# Patient Record
Sex: FEMALE | Race: BLACK OR AFRICAN AMERICAN | Employment: UNEMPLOYED | URBAN - METROPOLITAN AREA
[De-identification: names, ages, dates, MRNs, and addresses within clinical notes are randomized per-mention and may not be internally consistent; named-entity substitution may affect disease eponyms.]

---

## 2018-04-30 ENCOUNTER — HOSPITAL ENCOUNTER (OUTPATIENT)
Dept: NON INVASIVE DIAGNOSTICS | Age: 43
Discharge: HOME | End: 2018-04-30
Attending: INTERNAL MEDICINE
Payer: MEDICAID

## 2018-04-30 VITALS — WEIGHT: 293 LBS | BODY MASS INDEX: 47.09 KG/M2 | HEIGHT: 66 IN

## 2018-04-30 DIAGNOSIS — R94.31 ABNORMAL ELECTROCARDIOGRAM: ICD-10-CM

## 2018-04-30 DIAGNOSIS — R06.02 SHORTNESS OF BREATH: ICD-10-CM

## 2018-04-30 LAB
ATTENDING PHYSICIAN, CST07: NORMAL
DIAGNOSIS, 93000: NORMAL
DUKE TM SCORE RESULT, CST14: NORMAL
DUKE TREADMILL SCORE, CST13: NORMAL
ECG INTERP BEFORE EX, CST11: NORMAL
ECG INTERP DURING EX, CST12: NORMAL
FUNCTIONAL CAPACITY, CST17: NORMAL
KNOWN CARDIAC CONDITION, CST08: NORMAL
MAX. DIASTOLIC BP, CST04: 90 MMHG
MAX. HEART RATE, CST05: 160 BPM
MAX. SYSTOLIC BP, CST03: 185 MMHG
OVERALL BP RESPONSE TO EXERCISE, CST16: NORMAL
OVERALL HR RESPONSE TO EXERCISE, CST15: NORMAL
PEAK EX METS, CST10: 4.6 METS
PROTOCOL NAME, CST01: NORMAL
TEST INDICATION, CST09: NORMAL

## 2018-04-30 PROCEDURE — 93351 STRESS TTE COMPLETE: CPT

## 2018-04-30 RX ORDER — ATROPINE SULFATE 0.1 MG/ML
1 INJECTION INTRAVENOUS
Status: ACTIVE | OUTPATIENT
Start: 2018-04-30 | End: 2018-04-30

## 2018-04-30 RX ORDER — DOBUTAMINE HYDROCHLORIDE 200 MG/100ML
10-40 INJECTION INTRAVENOUS
Status: ACTIVE | OUTPATIENT
Start: 2018-04-30 | End: 2018-04-30

## 2019-05-13 ENCOUNTER — HOSPITAL ENCOUNTER (OUTPATIENT)
Dept: MRI IMAGING | Age: 44
Discharge: HOME | End: 2019-05-13
Attending: ORTHOPAEDIC SURGERY
Payer: MEDICAID

## 2019-05-13 DIAGNOSIS — M25.511 PAIN IN SHOULDER REGION, RIGHT: ICD-10-CM

## 2019-05-13 PROCEDURE — 73221 MRI JOINT UPR EXTREM W/O DYE: CPT

## 2019-06-21 ENCOUNTER — HOSPITAL ENCOUNTER (OUTPATIENT)
Dept: PREADMISSION TESTING | Age: 44
Discharge: HOME | End: 2019-06-21
Payer: MEDICAID

## 2019-06-21 VITALS
TEMPERATURE: 97.9 F | SYSTOLIC BLOOD PRESSURE: 105 MMHG | HEIGHT: 66 IN | BODY MASS INDEX: 47.09 KG/M2 | HEART RATE: 82 BPM | DIASTOLIC BLOOD PRESSURE: 61 MMHG | WEIGHT: 293 LBS | RESPIRATION RATE: 18 BRPM

## 2019-06-21 LAB
ATRIAL RATE: 61 BPM
CALCULATED P AXIS, ECG09: 3 DEGREES
CALCULATED R AXIS, ECG10: 31 DEGREES
CALCULATED T AXIS, ECG11: -27 DEGREES
CREAT SERPL-MCNC: 1.06 MG/DL (ref 0.55–1.02)
DIAGNOSIS, 93000: NORMAL
HCG SERPL QL: NEGATIVE
HGB BLD-MCNC: 12.8 G/DL (ref 11.5–16)
P-R INTERVAL, ECG05: 186 MS
POTASSIUM SERPL-SCNC: 3.8 MMOL/L (ref 3.5–5.1)
Q-T INTERVAL, ECG07: 402 MS
QRS DURATION, ECG06: 78 MS
QTC CALCULATION (BEZET), ECG08: 404 MS
VENTRICULAR RATE, ECG03: 61 BPM

## 2019-06-21 PROCEDURE — 85018 HEMOGLOBIN: CPT

## 2019-06-21 PROCEDURE — 36415 COLL VENOUS BLD VENIPUNCTURE: CPT

## 2019-06-21 PROCEDURE — 82565 ASSAY OF CREATININE: CPT

## 2019-06-21 PROCEDURE — 84132 ASSAY OF SERUM POTASSIUM: CPT

## 2019-06-21 PROCEDURE — 84703 CHORIONIC GONADOTROPIN ASSAY: CPT

## 2019-06-21 PROCEDURE — 93005 ELECTROCARDIOGRAM TRACING: CPT

## 2019-06-21 RX ORDER — IPRATROPIUM BROMIDE AND ALBUTEROL SULFATE 2.5; .5 MG/3ML; MG/3ML
3 SOLUTION RESPIRATORY (INHALATION)
COMMUNITY
End: 2020-07-30

## 2019-06-21 RX ORDER — PRAZOSIN HYDROCHLORIDE 5 MG/1
5 CAPSULE ORAL
COMMUNITY
End: 2020-09-27

## 2019-06-21 RX ORDER — TOPIRAMATE 100 MG/1
100 TABLET, FILM COATED ORAL 2 TIMES DAILY
COMMUNITY

## 2019-06-21 RX ORDER — METHOCARBAMOL 750 MG/1
750 TABLET, FILM COATED ORAL
COMMUNITY
End: 2020-11-23

## 2019-06-21 RX ORDER — SPIRONOLACTONE 25 MG/1
25 TABLET ORAL DAILY
COMMUNITY
End: 2020-10-21

## 2019-06-21 RX ORDER — RANITIDINE 150 MG/1
150 TABLET, FILM COATED ORAL 2 TIMES DAILY
COMMUNITY
End: 2020-10-21

## 2019-06-21 RX ORDER — PRAZOSIN HYDROCHLORIDE 2 MG/1
2 CAPSULE ORAL 2 TIMES DAILY WITH MEALS
COMMUNITY
End: 2020-08-17

## 2019-06-21 RX ORDER — MELOXICAM 15 MG/1
15 TABLET ORAL DAILY
COMMUNITY
End: 2020-11-23

## 2019-06-21 RX ORDER — CLONIDINE HYDROCHLORIDE 0.1 MG/1
0.1 TABLET ORAL 2 TIMES DAILY
COMMUNITY
End: 2020-08-17

## 2019-06-21 RX ORDER — MONTELUKAST SODIUM 10 MG/1
10 TABLET ORAL DAILY
COMMUNITY
End: 2021-01-20

## 2019-06-21 NOTE — PERIOP NOTES
DO NOT EAT OR DRINK ANYTHING AFTER MIDNIGHT, except as instructed THE NIGHT BEFORE SURGERY. PT GIVEN OPPORTUNITY TO ASK ADDITIONAL QUESTIONS. Patient given two bottles CHG soap and instruction sheet, instructions for use reviewed with patient. Patient given surgical site infection information FAQs handout and hand hygiene tips sheet. Pre-operative instructions reviewed and patient verbalizes understanding of instructions. Patient has been given the opportunity to ask additional questions. NO ORDERS SENT TO PAT OR PLACED IN CC. OFFICE CALLED TO SPEAK WITH DR RIA JIANG'S MA. APOLINAR HAD VM STATING SHE WAS OUT OF OFFICE UNTIL Monday, 6/24/19. MESSAGE LEFT FOR NURSE CHECKING APOLINAR'S MESSAGES. SHOULDER PROTOCOL LABS ORDERED AND DONE. SPOKE WITH BECKY AT #[phone]. SHE STATES SHOULDER PROTOCOL WAS OKAY TO DO ALONG WITH AN EKG BASED ON PT'S WEIGHT. SHE WILL PUT A NOTE IN PT'S OFFICE CHART.

## 2019-06-24 ENCOUNTER — ANESTHESIA EVENT (OUTPATIENT)
Dept: SURGERY | Age: 44
End: 2019-06-24
Payer: MEDICAID

## 2019-06-25 ENCOUNTER — ANESTHESIA (OUTPATIENT)
Dept: SURGERY | Age: 44
End: 2019-06-25
Payer: MEDICAID

## 2019-06-25 ENCOUNTER — HOSPITAL ENCOUNTER (OUTPATIENT)
Age: 44
Discharge: HOME | End: 2019-06-25
Attending: ORTHOPAEDIC SURGERY | Admitting: ORTHOPAEDIC SURGERY
Payer: MEDICAID

## 2019-06-25 VITALS
DIASTOLIC BLOOD PRESSURE: 72 MMHG | SYSTOLIC BLOOD PRESSURE: 139 MMHG | BODY MASS INDEX: 47.09 KG/M2 | HEART RATE: 75 BPM | OXYGEN SATURATION: 94 % | WEIGHT: 293 LBS | TEMPERATURE: 97.7 F | HEIGHT: 66 IN | RESPIRATION RATE: 20 BRPM

## 2019-06-25 PROBLEM — M75.41 IMPINGEMENT SYNDROME OF RIGHT SHOULDER: Status: ACTIVE | Noted: 2019-06-25

## 2019-06-25 LAB — HCG UR QL: NEGATIVE

## 2019-06-25 PROCEDURE — 76060000036 HC ANESTHESIA 2.5 TO 3 HR: Performed by: ORTHOPAEDIC SURGERY

## 2019-06-25 PROCEDURE — 74011250637 HC RX REV CODE- 250/637: Performed by: ANESTHESIOLOGY

## 2019-06-25 PROCEDURE — 77030018835 HC SOL IRR LR ICUM -A: Performed by: ORTHOPAEDIC SURGERY

## 2019-06-25 PROCEDURE — 74011000250 HC RX REV CODE- 250

## 2019-06-25 PROCEDURE — 74011250636 HC RX REV CODE- 250/636

## 2019-06-25 PROCEDURE — 77030006988: Performed by: ORTHOPAEDIC SURGERY

## 2019-06-25 PROCEDURE — 77030026438 HC STYL ET INTUB CARD -A: Performed by: ANESTHESIOLOGY

## 2019-06-25 PROCEDURE — 77030002916 HC SUT ETHLN J&J -A: Performed by: ORTHOPAEDIC SURGERY

## 2019-06-25 PROCEDURE — 77030008684 HC TU ET CUF COVD -B: Performed by: ANESTHESIOLOGY

## 2019-06-25 PROCEDURE — 77030003601 HC NDL NRV BLK BBMI -A

## 2019-06-25 PROCEDURE — 77030020782 HC GWN BAIR PAWS FLX 3M -B

## 2019-06-25 PROCEDURE — A4565 SLINGS: HCPCS | Performed by: ORTHOPAEDIC SURGERY

## 2019-06-25 PROCEDURE — 76010000131 HC OR TIME 2 TO 2.5 HR: Performed by: ORTHOPAEDIC SURGERY

## 2019-06-25 PROCEDURE — 74011250636 HC RX REV CODE- 250/636: Performed by: ORTHOPAEDIC SURGERY

## 2019-06-25 PROCEDURE — 74011250637 HC RX REV CODE- 250/637: Performed by: ORTHOPAEDIC SURGERY

## 2019-06-25 PROCEDURE — 76210000001 HC OR PH I REC 2.5 TO 3 HR: Performed by: ORTHOPAEDIC SURGERY

## 2019-06-25 PROCEDURE — 76210000020 HC REC RM PH II FIRST 0.5 HR: Performed by: ORTHOPAEDIC SURGERY

## 2019-06-25 PROCEDURE — 74011250636 HC RX REV CODE- 250/636: Performed by: ANESTHESIOLOGY

## 2019-06-25 PROCEDURE — 81025 URINE PREGNANCY TEST: CPT

## 2019-06-25 RX ORDER — SODIUM CHLORIDE 0.9 % (FLUSH) 0.9 %
5-40 SYRINGE (ML) INJECTION AS NEEDED
Status: DISCONTINUED | OUTPATIENT
Start: 2019-06-25 | End: 2019-06-25 | Stop reason: HOSPADM

## 2019-06-25 RX ORDER — ROCURONIUM BROMIDE 10 MG/ML
INJECTION, SOLUTION INTRAVENOUS AS NEEDED
Status: DISCONTINUED | OUTPATIENT
Start: 2019-06-25 | End: 2019-06-25 | Stop reason: HOSPADM

## 2019-06-25 RX ORDER — FENTANYL CITRATE 50 UG/ML
50 INJECTION, SOLUTION INTRAMUSCULAR; INTRAVENOUS AS NEEDED
Status: DISCONTINUED | OUTPATIENT
Start: 2019-06-25 | End: 2019-06-25 | Stop reason: HOSPADM

## 2019-06-25 RX ORDER — LIDOCAINE HYDROCHLORIDE 10 MG/ML
0.1 INJECTION, SOLUTION EPIDURAL; INFILTRATION; INTRACAUDAL; PERINEURAL AS NEEDED
Status: DISCONTINUED | OUTPATIENT
Start: 2019-06-25 | End: 2019-06-25 | Stop reason: HOSPADM

## 2019-06-25 RX ORDER — FENTANYL CITRATE 50 UG/ML
INJECTION, SOLUTION INTRAMUSCULAR; INTRAVENOUS AS NEEDED
Status: DISCONTINUED | OUTPATIENT
Start: 2019-06-25 | End: 2019-06-25 | Stop reason: HOSPADM

## 2019-06-25 RX ORDER — SODIUM CHLORIDE 9 MG/ML
25 INJECTION, SOLUTION INTRAVENOUS CONTINUOUS
Status: DISCONTINUED | OUTPATIENT
Start: 2019-06-25 | End: 2019-06-25 | Stop reason: HOSPADM

## 2019-06-25 RX ORDER — ACETAMINOPHEN 325 MG/1
650 TABLET ORAL ONCE
Status: COMPLETED | OUTPATIENT
Start: 2019-06-25 | End: 2019-06-25

## 2019-06-25 RX ORDER — VANCOMYCIN/0.9 % SOD CHLORIDE 1.5G/250ML
1.5 PLASTIC BAG, INJECTION (ML) INTRAVENOUS ONCE
Status: COMPLETED | OUTPATIENT
Start: 2019-06-25 | End: 2019-06-25

## 2019-06-25 RX ORDER — MIDAZOLAM HYDROCHLORIDE 1 MG/ML
1 INJECTION, SOLUTION INTRAMUSCULAR; INTRAVENOUS AS NEEDED
Status: DISCONTINUED | OUTPATIENT
Start: 2019-06-25 | End: 2019-06-25 | Stop reason: HOSPADM

## 2019-06-25 RX ORDER — SODIUM CHLORIDE 9 MG/ML
100 INJECTION, SOLUTION INTRAVENOUS CONTINUOUS
Status: DISCONTINUED | OUTPATIENT
Start: 2019-06-25 | End: 2019-06-25 | Stop reason: HOSPADM

## 2019-06-25 RX ORDER — SUCCINYLCHOLINE CHLORIDE 20 MG/ML
INJECTION INTRAMUSCULAR; INTRAVENOUS AS NEEDED
Status: DISCONTINUED | OUTPATIENT
Start: 2019-06-25 | End: 2019-06-25 | Stop reason: HOSPADM

## 2019-06-25 RX ORDER — LIDOCAINE HYDROCHLORIDE 20 MG/ML
INJECTION, SOLUTION EPIDURAL; INFILTRATION; INTRACAUDAL; PERINEURAL AS NEEDED
Status: DISCONTINUED | OUTPATIENT
Start: 2019-06-25 | End: 2019-06-25 | Stop reason: HOSPADM

## 2019-06-25 RX ORDER — NEOSTIGMINE METHYLSULFATE 1 MG/ML
INJECTION INTRAVENOUS AS NEEDED
Status: DISCONTINUED | OUTPATIENT
Start: 2019-06-25 | End: 2019-06-25 | Stop reason: HOSPADM

## 2019-06-25 RX ORDER — SODIUM CHLORIDE 0.9 % (FLUSH) 0.9 %
5-40 SYRINGE (ML) INJECTION EVERY 8 HOURS
Status: DISCONTINUED | OUTPATIENT
Start: 2019-06-25 | End: 2019-06-25 | Stop reason: HOSPADM

## 2019-06-25 RX ORDER — ONDANSETRON 2 MG/ML
4 INJECTION INTRAMUSCULAR; INTRAVENOUS AS NEEDED
Status: DISCONTINUED | OUTPATIENT
Start: 2019-06-25 | End: 2019-06-25 | Stop reason: HOSPADM

## 2019-06-25 RX ORDER — ALBUTEROL SULFATE 90 UG/1
90 AEROSOL, METERED RESPIRATORY (INHALATION)
COMMUNITY
Start: 2019-06-19 | End: 2020-11-23

## 2019-06-25 RX ORDER — SODIUM CHLORIDE 0.9 % (FLUSH) 0.9 %
5-40 SYRINGE (ML) INJECTION EVERY 8 HOURS
Status: CANCELLED | OUTPATIENT
Start: 2019-06-25

## 2019-06-25 RX ORDER — DEXAMETHASONE SODIUM PHOSPHATE 4 MG/ML
INJECTION, SOLUTION INTRA-ARTICULAR; INTRALESIONAL; INTRAMUSCULAR; INTRAVENOUS; SOFT TISSUE AS NEEDED
Status: DISCONTINUED | OUTPATIENT
Start: 2019-06-25 | End: 2019-06-25 | Stop reason: HOSPADM

## 2019-06-25 RX ORDER — SODIUM CHLORIDE 0.9 % (FLUSH) 0.9 %
5-40 SYRINGE (ML) INJECTION AS NEEDED
Status: CANCELLED | OUTPATIENT
Start: 2019-06-25

## 2019-06-25 RX ORDER — MIDAZOLAM HYDROCHLORIDE 1 MG/ML
0.5 INJECTION, SOLUTION INTRAMUSCULAR; INTRAVENOUS
Status: DISCONTINUED | OUTPATIENT
Start: 2019-06-25 | End: 2019-06-25 | Stop reason: HOSPADM

## 2019-06-25 RX ORDER — FENTANYL CITRATE 50 UG/ML
25 INJECTION, SOLUTION INTRAMUSCULAR; INTRAVENOUS
Status: DISCONTINUED | OUTPATIENT
Start: 2019-06-25 | End: 2019-06-25 | Stop reason: HOSPADM

## 2019-06-25 RX ORDER — PROPOFOL 10 MG/ML
INJECTION, EMULSION INTRAVENOUS AS NEEDED
Status: DISCONTINUED | OUTPATIENT
Start: 2019-06-25 | End: 2019-06-25 | Stop reason: HOSPADM

## 2019-06-25 RX ORDER — GLYCOPYRROLATE 0.2 MG/ML
INJECTION INTRAMUSCULAR; INTRAVENOUS AS NEEDED
Status: DISCONTINUED | OUTPATIENT
Start: 2019-06-25 | End: 2019-06-25 | Stop reason: HOSPADM

## 2019-06-25 RX ORDER — ONDANSETRON 2 MG/ML
INJECTION INTRAMUSCULAR; INTRAVENOUS AS NEEDED
Status: DISCONTINUED | OUTPATIENT
Start: 2019-06-25 | End: 2019-06-25 | Stop reason: HOSPADM

## 2019-06-25 RX ORDER — DIPHENHYDRAMINE HYDROCHLORIDE 50 MG/ML
12.5 INJECTION, SOLUTION INTRAMUSCULAR; INTRAVENOUS AS NEEDED
Status: DISCONTINUED | OUTPATIENT
Start: 2019-06-25 | End: 2019-06-25 | Stop reason: HOSPADM

## 2019-06-25 RX ORDER — HYDROMORPHONE HYDROCHLORIDE 1 MG/ML
0.2 INJECTION, SOLUTION INTRAMUSCULAR; INTRAVENOUS; SUBCUTANEOUS
Status: DISCONTINUED | OUTPATIENT
Start: 2019-06-25 | End: 2019-06-25 | Stop reason: HOSPADM

## 2019-06-25 RX ORDER — SODIUM CHLORIDE, SODIUM LACTATE, POTASSIUM CHLORIDE, CALCIUM CHLORIDE 600; 310; 30; 20 MG/100ML; MG/100ML; MG/100ML; MG/100ML
75 INJECTION, SOLUTION INTRAVENOUS CONTINUOUS
Status: DISCONTINUED | OUTPATIENT
Start: 2019-06-25 | End: 2019-06-25 | Stop reason: HOSPADM

## 2019-06-25 RX ORDER — HYDROMORPHONE HYDROCHLORIDE 2 MG/1
2 TABLET ORAL ONCE
Status: COMPLETED | OUTPATIENT
Start: 2019-06-25 | End: 2019-06-25

## 2019-06-25 RX ORDER — SODIUM CHLORIDE, SODIUM LACTATE, POTASSIUM CHLORIDE, CALCIUM CHLORIDE 600; 310; 30; 20 MG/100ML; MG/100ML; MG/100ML; MG/100ML
INJECTION, SOLUTION INTRAVENOUS
Status: DISCONTINUED | OUTPATIENT
Start: 2019-06-25 | End: 2019-06-25 | Stop reason: HOSPADM

## 2019-06-25 RX ORDER — ROPIVACAINE HYDROCHLORIDE 5 MG/ML
30 INJECTION, SOLUTION EPIDURAL; INFILTRATION; PERINEURAL ONCE
Status: DISCONTINUED | OUTPATIENT
Start: 2019-06-25 | End: 2019-06-25 | Stop reason: HOSPADM

## 2019-06-25 RX ORDER — MORPHINE SULFATE 10 MG/ML
2 INJECTION, SOLUTION INTRAMUSCULAR; INTRAVENOUS
Status: DISCONTINUED | OUTPATIENT
Start: 2019-06-25 | End: 2019-06-25 | Stop reason: HOSPADM

## 2019-06-25 RX ORDER — SODIUM CHLORIDE, SODIUM LACTATE, POTASSIUM CHLORIDE, CALCIUM CHLORIDE 600; 310; 30; 20 MG/100ML; MG/100ML; MG/100ML; MG/100ML
125 INJECTION, SOLUTION INTRAVENOUS CONTINUOUS
Status: DISCONTINUED | OUTPATIENT
Start: 2019-06-25 | End: 2019-06-25 | Stop reason: HOSPADM

## 2019-06-25 RX ADMIN — HYDROMORPHONE HYDROCHLORIDE 2 MG: 2 TABLET ORAL at 17:13

## 2019-06-25 RX ADMIN — FENTANYL CITRATE 50 MCG: 50 INJECTION, SOLUTION INTRAMUSCULAR; INTRAVENOUS at 14:21

## 2019-06-25 RX ADMIN — DEXAMETHASONE SODIUM PHOSPHATE 8 MG: 4 INJECTION, SOLUTION INTRA-ARTICULAR; INTRALESIONAL; INTRAMUSCULAR; INTRAVENOUS; SOFT TISSUE at 13:39

## 2019-06-25 RX ADMIN — PROPOFOL 200 MG: 10 INJECTION, EMULSION INTRAVENOUS at 13:02

## 2019-06-25 RX ADMIN — LIDOCAINE HYDROCHLORIDE 100 MG: 20 INJECTION, SOLUTION EPIDURAL; INFILTRATION; INTRACAUDAL; PERINEURAL at 13:02

## 2019-06-25 RX ADMIN — ROCURONIUM BROMIDE 40 MG: 10 INJECTION, SOLUTION INTRAVENOUS at 13:14

## 2019-06-25 RX ADMIN — NEOSTIGMINE METHYLSULFATE 2 MG: 1 INJECTION INTRAVENOUS at 14:40

## 2019-06-25 RX ADMIN — MIDAZOLAM HYDROCHLORIDE 3 MG: 1 INJECTION, SOLUTION INTRAMUSCULAR; INTRAVENOUS at 12:39

## 2019-06-25 RX ADMIN — FENTANYL CITRATE 25 MCG: 50 INJECTION INTRAMUSCULAR; INTRAVENOUS at 15:47

## 2019-06-25 RX ADMIN — ACETAMINOPHEN 650 MG: 325 TABLET ORAL at 12:28

## 2019-06-25 RX ADMIN — FENTANYL CITRATE 25 MCG: 50 INJECTION INTRAMUSCULAR; INTRAVENOUS at 16:15

## 2019-06-25 RX ADMIN — SODIUM CHLORIDE, SODIUM LACTATE, POTASSIUM CHLORIDE, CALCIUM CHLORIDE: 600; 310; 30; 20 INJECTION, SOLUTION INTRAVENOUS at 12:30

## 2019-06-25 RX ADMIN — FENTANYL CITRATE 50 MCG: 50 INJECTION, SOLUTION INTRAMUSCULAR; INTRAVENOUS at 13:02

## 2019-06-25 RX ADMIN — PROPOFOL 100 MG: 10 INJECTION, EMULSION INTRAVENOUS at 14:21

## 2019-06-25 RX ADMIN — ONDANSETRON 4 MG: 2 INJECTION INTRAMUSCULAR; INTRAVENOUS at 14:38

## 2019-06-25 RX ADMIN — VANCOMYCIN HYDROCHLORIDE 1500 MG: 10 INJECTION, POWDER, LYOPHILIZED, FOR SOLUTION INTRAVENOUS at 12:34

## 2019-06-25 RX ADMIN — FENTANYL CITRATE 100 MCG: 50 INJECTION, SOLUTION INTRAMUSCULAR; INTRAVENOUS at 12:39

## 2019-06-25 RX ADMIN — FENTANYL CITRATE 50 MCG: 50 INJECTION, SOLUTION INTRAMUSCULAR; INTRAVENOUS at 14:35

## 2019-06-25 RX ADMIN — SUCCINYLCHOLINE CHLORIDE 200 MG: 20 INJECTION INTRAMUSCULAR; INTRAVENOUS at 13:02

## 2019-06-25 RX ADMIN — FENTANYL CITRATE 25 MCG: 50 INJECTION INTRAMUSCULAR; INTRAVENOUS at 15:30

## 2019-06-25 RX ADMIN — SODIUM CHLORIDE, SODIUM LACTATE, POTASSIUM CHLORIDE, AND CALCIUM CHLORIDE 125 ML/HR: 600; 310; 30; 20 INJECTION, SOLUTION INTRAVENOUS at 12:34

## 2019-06-25 RX ADMIN — GLYCOPYRROLATE 0.4 MG: 0.2 INJECTION INTRAMUSCULAR; INTRAVENOUS at 14:40

## 2019-06-25 RX ADMIN — ONDANSETRON 4 MG: 2 INJECTION INTRAMUSCULAR; INTRAVENOUS at 17:13

## 2019-06-25 NOTE — ANESTHESIA PREPROCEDURE EVALUATION
Relevant Problems   No relevant active problems       Anesthetic History   No history of anesthetic complications            Review of Systems / Medical History  Patient summary reviewed, nursing notes reviewed and pertinent labs reviewed    Pulmonary        Sleep apnea: CPAP    Asthma : well controlled       Neuro/Psych   Within defined limits           Cardiovascular    Hypertension: well controlled                   GI/Hepatic/Renal     GERD           Endo/Other        Morbid obesity     Other Findings              Physical Exam    Airway  Mallampati: II  TM Distance: > 6 cm  Neck ROM: normal range of motion   Mouth opening: Normal     Cardiovascular  Regular rate and rhythm,  S1 and S2 normal,  no murmur, click, rub, or gallop             Dental  No notable dental hx       Pulmonary  Breath sounds clear to auscultation               Abdominal  GI exam deferred       Other Findings            Anesthetic Plan    ASA: 3  Anesthesia type: general      Post-op pain plan if not by surgeon: peripheral nerve block single    Induction: Intravenous  Anesthetic plan and risks discussed with: Patient

## 2019-06-25 NOTE — ANESTHESIA POSTPROCEDURE EVALUATION
Post-Anesthesia Evaluation and Assessment    Patient: Luciano Salgado MRN: [id number]  SSN: [id number]    YOB: 1975  Age: 37 y.o. Sex: female      I have evaluated the patient and they are stable and ready for discharge from the PACU. Cardiovascular Function/Vital Signs  Visit Vitals  /82   Pulse 77   Temp 36.5 °C (97.7 °F)   Resp 22   Ht 5' 6\" (1.676 m)   Wt (!) 170 kg (374 lb 12.8 oz)   SpO2 95%   BMI 60.49 kg/m²       Patient is status post General anesthesia for Procedure(s):  RIGHT SHOULDER ARTHROSCOPY/ ACROMIOPLASTY/DISTAL CLAVICLE EXCISION. Nausea/Vomiting: None    Postoperative hydration reviewed and adequate. Pain:  Pain Scale 1: Visual (06/25/19 1525)  Pain Intensity 1: 0 (06/25/19 1525)   Managed    Neurological Status:   Neuro (WDL): Exceptions to WDL (06/25/19 1525)  Neuro  Neurologic State: Drowsy (06/25/19 1525)  LUE Motor Response: Purposeful (06/25/19 1525)  LLE Motor Response: Purposeful (06/25/19 1525)  RUE Motor Response: Numbness (06/25/19 1525)  RLE Motor Response: Purposeful (06/25/19 1525)   At baseline    Mental Status, Level of Consciousness: Alert and  oriented to person, place, and time    Pulmonary Status:   O2 Device: Nasal cannula (06/25/19 1525)   Adequate oxygenation and airway patent    Complications related to anesthesia: None    Post-anesthesia assessment completed. No concerns    Signed By: Diego Ruelas MD     June 25, 2019              Procedure(s):  RIGHT SHOULDER ARTHROSCOPY/ ACROMIOPLASTY/DISTAL CLAVICLE EXCISION. general    <BSHSIANPOST>    Vitals Value Taken Time   /90 6/25/2019  4:30 PM   Temp 36.5 °C (97.7 °F) 6/25/2019  3:25 PM   Pulse 73 6/25/2019  4:38 PM   Resp 18 6/25/2019  4:38 PM   SpO2 93 % 6/25/2019  4:38 PM   Vitals shown include unvalidated device data.

## 2019-06-25 NOTE — ANESTHESIA PROCEDURE NOTES
Peripheral Block    Start time: 6/25/2019 12:36 PM  End time: 6/25/2019 12:42 PM  Performed by: Aníbal Patterson MD  Authorized by: Aníbal Patterson MD       Pre-procedure: Indications: at surgeon's request and post-op pain management    Preanesthetic Checklist: patient identified, risks and benefits discussed, site marked, timeout performed, anesthesia consent given and patient being monitored    Timeout Time: 12:36          Block Type:   Block Type:   Interscalene and supraclavicular  Laterality:  Right  Monitoring:  Standard ASA monitoring, continuous pulse ox, frequent vital sign checks, heart rate, responsive to questions and oxygen  Injection Technique:  Single shot  Procedures: ultrasound guided and nerve stimulator    Patient Position: supine  Prep: betadine and povidone-iodine 7.5% surgical scrub    Location:  Interscalene  Needle Type:  Stimuplex  Needle Gauge:  22 G  Needle Localization:  Nerve stimulator and ultrasound guidance  Motor Response: minimal motor response >0.4 mA      Assessment:  Number of attempts:  1  Injection Assessment:  Incremental injection every 5 mL, local visualized surrounding nerve on ultrasound, negative aspiration for blood, no paresthesia, negative aspiration for CSF and ultrasound image on chart  Patient tolerance:  Patient tolerated the procedure well with no immediate complications

## 2019-06-26 NOTE — OP NOTES
[facility]  OPERATIVE REPORT    Name:  Rhea Hudson  MR#:  [id number]  :  1975  ACCOUNT #:  [de-identified]  DATE OF SERVICE:  2019    PREOPERATIVE DIAGNOSES:  Right shoulder partial rotator cuff tear, impingement, acromioclavicular joint arthrosis, bursitis. POSTOPERATIVE DIAGNOSIS:  same    PROCEDURE PERFORMED:  Right shoulder arthroscopy with partial rotator cuff debridement, bursectomy, acromioplasty, distal clavicle excision. SURGEON:  Kendra Lares MD    FIRST ASSISTANT:  Lashell Davies MD    ANESTHESIA:  General with a block. COMPLICATIONS:  None. SPECIMENS REMOVED:  None. ESTIMATED BLOOD LOSS:  Approximately 5 mL. DRAINS:  None. INDICATION FOR PROCEDURE:  The patient is a 13-year-old female with a long history of  bilateral shoulder pain. She initially had her left side operated on and has done well and now presents for the right side with MRI-documented pathology after extensive preoperative workup. PROCEDURE IN DETAIL:   After being explained the risks and benefits, undergoing extensive preoperative workup, the patient underwent informed consent for the above procedure. She has been kept n.p.o. overnight and received preoperative antibiotics. The patient was taken from the holding area to the operating room. After general anesthesia had been established with a block, the patient was placed in lateral decubitus position in a well-padded beanbag and axillary roll with the right side up. Right upper extremity was examined under anesthesia, prepped and draped in standard surgical fashion, placed in 10 pounds of general suspension. Time-out was called. Correct patient and site were identified with the operating room staff and Anesthesia staff. We then proceeded to establish posterior portal.  Diagnostic arthroscopy of the glenohumeral joint was undertaken. There were no loose bodies within the glenohumeral joint.   There were no significant chondral lesions. There was some fraying to the superior labrum, but no yefri tears. There was some undersurface fraying of the supraspinatus. An anterior portal was established and this was debrided to a stable edge. No evidence of high-grade partial tear. Subscapularis was pristine as well as the infraspinatus. Biceps was intact. Biceps pulley was intact. No evidence of capsular or labral tearing. We then proceeded with subacromial space. We established an anterior and lateral portal.  A thorough bursectomy was undertaken. There was no evidence of a bursal-sided rotator cuff tear. We proceeded to perform a standard acromioplasty removing approximately 7 mm of bone from the anterior acromion. We proceeded to perform a standard distal clavicle excision removing approximately 10 mm of bone from the distal clavicle. We took final arthroscopic pictures. We irrigated copiously. We closed the portals with interrupted nylon suture. Dry sterile dressing was applied. At the end of the case, the counts were correct. The patient was placed in a sling and taken to recovery room in stable condition.         Rajesh Alcala MD      [id number]  D:  06/25/2019 14:43  T:  06/25/2019 20:55  JOB #:  [id number]  CC:  Kishore Phililps MD

## 2019-11-13 ENCOUNTER — HOSPITAL ENCOUNTER (OUTPATIENT)
Dept: GENERAL RADIOLOGY | Age: 44
Discharge: HOME | End: 2019-11-13
Payer: COMMERCIAL

## 2019-11-13 DIAGNOSIS — M17.0 PRIMARY OSTEOARTHRITIS OF BOTH KNEES: ICD-10-CM

## 2019-11-13 PROCEDURE — 72114 X-RAY EXAM L-S SPINE BENDING: CPT

## 2019-11-13 PROCEDURE — 73565 X-RAY EXAM OF KNEES: CPT

## 2020-06-18 ENCOUNTER — HOSPITAL ENCOUNTER (OUTPATIENT)
Dept: MRI IMAGING | Age: 45
Discharge: HOME | End: 2020-06-18
Attending: PAIN MEDICINE
Payer: COMMERCIAL

## 2020-06-18 ENCOUNTER — HOSPITAL ENCOUNTER (OUTPATIENT)
Dept: GENERAL RADIOLOGY | Age: 45
Discharge: HOME | End: 2020-06-18
Attending: INTERNAL MEDICINE
Payer: COMMERCIAL

## 2020-06-18 DIAGNOSIS — J44.9 COPD WITH ASTHMA (HCC): ICD-10-CM

## 2020-06-18 DIAGNOSIS — M54.16 RADICULOPATHY, LUMBAR REGION: ICD-10-CM

## 2020-06-18 PROCEDURE — 71046 X-RAY EXAM CHEST 2 VIEWS: CPT

## 2020-06-18 PROCEDURE — 72148 MRI LUMBAR SPINE W/O DYE: CPT

## 2020-07-23 ENCOUNTER — TELEPHONE (OUTPATIENT)
Dept: PRIMARY CARE CLINIC | Age: 45
End: 2020-07-23

## 2020-07-27 NOTE — TELEPHONE ENCOUNTER
Patient called and asked about the note informed of your message and that she is not able to have a note furnished. She states she needs it for her housing and because she is not receiving disability yet they need a letter from you saying she is disabled to qualify for [id number].

## 2020-07-27 NOTE — TELEPHONE ENCOUNTER
I am not aware of the reason why this pt is disabled. I can only imagine its for psychiatric reasons. I am not able to furnish any letter.     She will need to contact  for a letter or her psychiatrist.    ( generally people who are disabled have  A letter form  they received when they 1st were documented as disabled)

## 2020-08-05 ENCOUNTER — TELEPHONE (OUTPATIENT)
Dept: PRIMARY CARE CLINIC | Age: 45
End: 2020-08-05

## 2020-08-05 DIAGNOSIS — G44.229 CHRONIC TENSION-TYPE HEADACHE, NOT INTRACTABLE: ICD-10-CM

## 2020-08-06 DIAGNOSIS — F51.5 NIGHTMARE DISORDER: ICD-10-CM

## 2020-08-11 VITALS
WEIGHT: 293 LBS | RESPIRATION RATE: 20 BRPM | OXYGEN SATURATION: 98 % | HEIGHT: 66 IN | TEMPERATURE: 98 F | BODY MASS INDEX: 47.09 KG/M2 | DIASTOLIC BLOOD PRESSURE: 90 MMHG | HEART RATE: 103 BPM | SYSTOLIC BLOOD PRESSURE: 150 MMHG

## 2020-08-13 VITALS
WEIGHT: 293 LBS | TEMPERATURE: 98 F | DIASTOLIC BLOOD PRESSURE: 90 MMHG | BODY MASS INDEX: 47.09 KG/M2 | OXYGEN SATURATION: 98 % | SYSTOLIC BLOOD PRESSURE: 150 MMHG | HEIGHT: 66 IN

## 2020-08-13 PROBLEM — L30.9 ECZEMA: Status: ACTIVE | Noted: 2020-08-13

## 2020-08-13 PROBLEM — M51.36 DEGENERATION OF LUMBAR INTERVERTEBRAL DISC: Status: ACTIVE | Noted: 2020-08-13

## 2020-08-13 PROBLEM — I10 BENIGN ESSENTIAL HYPERTENSION: Status: ACTIVE | Noted: 2020-08-13

## 2020-08-13 PROBLEM — G47.00 INSOMNIA: Status: ACTIVE | Noted: 2020-08-13

## 2020-08-13 PROBLEM — J45.40 MODERATE PERSISTENT ASTHMA: Status: ACTIVE | Noted: 2020-08-13

## 2020-08-13 PROBLEM — M17.9 OSTEOARTHRITIS OF KNEE: Status: ACTIVE | Noted: 2020-08-13

## 2020-08-13 PROBLEM — G56.03 BILATERAL CARPAL TUNNEL SYNDROME: Status: ACTIVE | Noted: 2020-08-13

## 2020-08-13 PROBLEM — M25.50 MULTIPLE JOINT PAIN: Status: ACTIVE | Noted: 2020-08-13

## 2020-08-13 PROBLEM — G44.221 CHRONIC TENSION-TYPE HEADACHE, INTRACTABLE: Status: ACTIVE | Noted: 2020-08-13

## 2020-08-13 PROBLEM — B37.2 CANDIDIASIS OF SKIN: Status: ACTIVE | Noted: 2020-08-13

## 2020-08-13 PROBLEM — R11.0 NAUSEA: Status: ACTIVE | Noted: 2020-08-13

## 2020-08-13 RX ORDER — FAMOTIDINE 20 MG/1
20 TABLET, FILM COATED ORAL 2 TIMES DAILY
COMMUNITY
End: 2020-10-21

## 2020-08-13 RX ORDER — IBUPROFEN 800 MG/1
TABLET ORAL
COMMUNITY
End: 2020-08-17

## 2020-08-13 RX ORDER — BUTALBITAL, ACETAMINOPHEN AND CAFFEINE 300; 40; 50 MG/1; MG/1; MG/1
CAPSULE ORAL
COMMUNITY
End: 2021-02-03 | Stop reason: SDUPTHER

## 2020-08-13 RX ORDER — OXYCODONE AND ACETAMINOPHEN 7.5; 325 MG/1; MG/1
1 TABLET ORAL 3 TIMES DAILY
COMMUNITY
End: 2022-02-08

## 2020-08-13 RX ORDER — NICOTINE 7MG/24HR
1 PATCH, TRANSDERMAL 24 HOURS TRANSDERMAL EVERY 24 HOURS
COMMUNITY
End: 2021-02-03

## 2020-08-13 RX ORDER — FLUOXETINE HYDROCHLORIDE 40 MG/1
CAPSULE ORAL DAILY
COMMUNITY
End: 2021-02-03

## 2020-08-13 RX ORDER — FLUTICASONE PROPIONATE 50 MCG
2 SPRAY, SUSPENSION (ML) NASAL DAILY
COMMUNITY
End: 2020-09-21

## 2020-08-13 RX ORDER — FLUOXETINE HYDROCHLORIDE 20 MG/1
CAPSULE ORAL DAILY
COMMUNITY
End: 2021-02-03

## 2020-08-13 RX ORDER — PREDNISONE 20 MG/1
TABLET ORAL
COMMUNITY
End: 2020-11-23

## 2020-08-13 RX ORDER — FLUTICASONE FUROATE, UMECLIDINIUM BROMIDE AND VILANTEROL TRIFENATATE 100; 62.5; 25 UG/1; UG/1; UG/1
1 POWDER RESPIRATORY (INHALATION) DAILY
COMMUNITY

## 2020-08-13 RX ORDER — BUDESONIDE AND FORMOTEROL FUMARATE DIHYDRATE 160; 4.5 UG/1; UG/1
2 AEROSOL RESPIRATORY (INHALATION)
COMMUNITY
End: 2020-11-23

## 2020-08-13 RX ORDER — NYSTATIN 100000 [USP'U]/G
POWDER TOPICAL 4 TIMES DAILY
COMMUNITY
End: 2021-09-17

## 2020-08-13 RX ORDER — CYCLOBENZAPRINE HCL 10 MG
TABLET ORAL
COMMUNITY
End: 2020-11-23 | Stop reason: ALTCHOICE

## 2020-08-13 RX ORDER — PREDNISONE 10 MG/1
TABLET ORAL
COMMUNITY
End: 2020-11-23

## 2020-08-13 RX ORDER — IBUPROFEN 200 MG
1 TABLET ORAL EVERY 24 HOURS
COMMUNITY
End: 2021-02-03

## 2020-08-13 RX ORDER — ONDANSETRON HYDROCHLORIDE 8 MG/1
8 TABLET, FILM COATED ORAL
COMMUNITY
End: 2020-11-23

## 2020-08-13 RX ORDER — IBUPROFEN 200 MG
1 TABLET ORAL EVERY 24 HOURS
COMMUNITY
End: 2021-09-17 | Stop reason: ALTCHOICE

## 2020-08-14 ENCOUNTER — VIRTUAL VISIT (OUTPATIENT)
Dept: PRIMARY CARE CLINIC | Age: 45
End: 2020-08-14
Payer: COMMERCIAL

## 2020-08-14 DIAGNOSIS — M51.36 DEGENERATION OF LUMBAR INTERVERTEBRAL DISC: Chronic | ICD-10-CM

## 2020-08-14 DIAGNOSIS — B37.2 CANDIDIASIS OF SKIN: Primary | ICD-10-CM

## 2020-08-14 DIAGNOSIS — M25.50 MULTIPLE JOINT PAIN: Chronic | ICD-10-CM

## 2020-08-14 PROCEDURE — 99213 OFFICE O/P EST LOW 20 MIN: CPT | Performed by: FAMILY MEDICINE

## 2020-08-17 RX ORDER — PRAZOSIN HYDROCHLORIDE 2 MG/1
CAPSULE ORAL
Qty: 180 CAP | Refills: 1 | Status: SHIPPED | OUTPATIENT
Start: 2020-08-17 | End: 2020-12-04

## 2020-08-17 RX ORDER — FUROSEMIDE 20 MG/1
TABLET ORAL
Qty: 180 TAB | Refills: 0 | Status: SHIPPED | OUTPATIENT
Start: 2020-08-17 | End: 2020-11-16

## 2020-08-17 RX ORDER — CLONIDINE HYDROCHLORIDE 0.1 MG/1
TABLET ORAL
Qty: 180 TAB | Refills: 0 | Status: SHIPPED | OUTPATIENT
Start: 2020-08-17 | End: 2020-09-27

## 2020-08-17 RX ORDER — IBUPROFEN 800 MG/1
TABLET ORAL
Qty: 60 TAB | Refills: 2 | Status: SHIPPED | OUTPATIENT
Start: 2020-08-17 | End: 2021-01-20

## 2020-08-17 RX ORDER — BUTALBITAL, ACETAMINOPHEN AND CAFFEINE 300; 40; 50 MG/1; MG/1; MG/1
CAPSULE ORAL
Qty: 40 CAP | Refills: 1 | Status: SHIPPED | OUTPATIENT
Start: 2020-08-17 | End: 2020-12-23

## 2020-09-01 NOTE — TELEPHONE ENCOUNTER
Patient's pharmacy is sending in a request for a mask for her nebulizer with the cup for the medicine to go into and they need dr. Sang Avitia. To sign it and send it back as soon as possible.

## 2020-09-13 NOTE — PROGRESS NOTES
HISTORY OF PRESENT ILLNESS  THIS IS  VIDEO VISIT PERFORMED THRU THE SECURE WEBSITE [email or web address]. Nuvia Barrientos is a 50 y.o. female. Needs form for housing filled out saying she is disabled. I explained  Her she has not been rendered disabled by the law so I cannot sign any note saying this. If she has documentation from  [facility] then she needs to use that paperwork. Back doctor orderd MRI and there is changes in spine and says     CPAP machine  Back on it . Candidiasis under panus and breasts and the nystantin bottle is too small. Past Medical History:   Diagnosis Date    Arthritis     Asthma     Autoimmune disease ([facility] [address]     FIBROMYALGIA    Back pain     DDD    Chronic pain     Depression     GERD (gastroesophageal reflux disease)     Headache     migraines    Hypertension     Morbid obesity (HCC)     Psychiatric disorder     DEPRESSION AND ANXIETY    Sleep apnea      Social History     Tobacco Use    Smoking status: Current Every Day Smoker     Packs/day: 1.00    Smokeless tobacco: Never Used   Substance Use Topics    Alcohol use: No    Drug use: No       Family History   Problem Relation Age of Onset    Asthma Mother     Hypertension Mother     Diabetes Brother     Hypertension Brother     Cancer Father         STOMACH    Depression Sister     Hypertension Daughter     Other Daughter         OVARIAN CYSTS    Anesth Problems Neg Hx      Review of Systems   Constitutional: Negative for chills, fever, malaise/fatigue and weight loss. Respiratory: Negative for cough and shortness of breath. Cardiovascular: Negative for chest pain, palpitations and leg swelling. Gastrointestinal: Negative for abdominal pain and heartburn. Musculoskeletal: Positive for back pain, joint pain and myalgias. Skin: Negative for rash. Neurological: Negative for dizziness, tremors and headaches. Psychiatric/Behavioral: Positive for depression. Negative for suicidal ideas.  The patient is nervous/anxious. The patient does not have insomnia. Physical Exam  Constitutional:       Appearance: Normal appearance. She is obese. HENT:      Head: Normocephalic and atraumatic. Eyes:      Extraocular Movements: Extraocular movements intact. Pulmonary:      Effort: Pulmonary effort is normal. No respiratory distress. Neurological:      Mental Status: She is alert and oriented to person, place, and time. Psychiatric:         Attention and Perception: Attention normal.         Mood and Affect: Mood is anxious. Speech: Speech is tangential.         Behavior: Behavior normal. Behavior is cooperative. Thought Content: Thought content normal.         Judgment: Judgment is impulsive. Diagnoses and all orders for this visit:    1. Candidiasis of skin    2. Degeneration of lumbar intervertebral disc  Comments:  advised pt that  the ddd wont cause death. bring your note to pain mx to discuss. 3. Multiple joint pain  Comments:  advised pt that I cannot write a note saying she is disbale bc it is a legal diagnosis.

## 2020-09-27 RX ORDER — CLONIDINE HYDROCHLORIDE 0.1 MG/1
TABLET ORAL
Qty: 180 TAB | Refills: 0 | Status: SHIPPED | OUTPATIENT
Start: 2020-09-27 | End: 2020-12-04

## 2020-09-27 RX ORDER — PRAZOSIN HYDROCHLORIDE 5 MG/1
CAPSULE ORAL
Qty: 90 CAP | Refills: 1 | Status: SHIPPED | OUTPATIENT
Start: 2020-09-27 | End: 2020-10-22

## 2020-10-12 ENCOUNTER — TRANSCRIBE ORDER (OUTPATIENT)
Dept: SCHEDULING | Age: 45
End: 2020-10-12

## 2020-10-12 DIAGNOSIS — M79.642 HAND PAIN, LEFT: Primary | ICD-10-CM

## 2020-10-22 RX ORDER — PRAZOSIN HYDROCHLORIDE 5 MG/1
CAPSULE ORAL
Qty: 90 CAP | Refills: 1 | Status: SHIPPED | OUTPATIENT
Start: 2020-10-22 | End: 2021-02-03

## 2020-10-26 ENCOUNTER — TRANSCRIBE ORDER (OUTPATIENT)
Dept: SCHEDULING | Age: 45
End: 2020-10-26

## 2020-10-26 DIAGNOSIS — M79.642 LEFT HAND PAIN: Primary | ICD-10-CM

## 2020-11-23 ENCOUNTER — OFFICE VISIT (OUTPATIENT)
Dept: PRIMARY CARE CLINIC | Age: 45
End: 2020-11-23
Payer: COMMERCIAL

## 2020-11-23 VITALS
WEIGHT: 293 LBS | OXYGEN SATURATION: 98 % | HEIGHT: 66 IN | DIASTOLIC BLOOD PRESSURE: 90 MMHG | HEART RATE: 90 BPM | SYSTOLIC BLOOD PRESSURE: 155 MMHG | RESPIRATION RATE: 20 BRPM | BODY MASS INDEX: 47.09 KG/M2 | TEMPERATURE: 98.2 F

## 2020-11-23 DIAGNOSIS — I10 BENIGN ESSENTIAL HYPERTENSION: ICD-10-CM

## 2020-11-23 DIAGNOSIS — G56.03 BILATERAL CARPAL TUNNEL SYNDROME: ICD-10-CM

## 2020-11-23 DIAGNOSIS — M51.36 DEGENERATION OF LUMBAR INTERVERTEBRAL DISC: Primary | Chronic | ICD-10-CM

## 2020-11-23 DIAGNOSIS — M25.50 MULTIPLE JOINT PAIN: Chronic | ICD-10-CM

## 2020-11-23 PROCEDURE — 99213 OFFICE O/P EST LOW 20 MIN: CPT | Performed by: FAMILY MEDICINE

## 2020-11-23 RX ORDER — CYCLOBENZAPRINE HCL 5 MG
5 TABLET ORAL
Qty: 90 TAB | Refills: 3 | Status: SHIPPED | OUTPATIENT
Start: 2020-11-23 | End: 2021-02-03 | Stop reason: SDUPTHER

## 2020-11-23 NOTE — PROGRESS NOTES
HISTORY OF PRESENT ILLNESS  Beckie Baig is a 50 y.o. female    This is not a physical, she is not due yet. GERD - somewhat ok on famotidne. No pr bleed, or xs belching. Back and knees the same even though lost some weight. Has steps where she lives and is trying to change apartment,     Hypertension:  PT taking HCTZ>  Does not check  BP at home        Depression :  Followed by psychiatry now Susi Richardson down [facility] and he did not want to  Check the BP there. . WE are writing the      prazosin at night for nightmares bc that doctor declined but is OK with us doing it. Says she has no urinary dysfunction. .      Shower chair issue and they need a new order bc the orther got damaged. It was not wide enough. Sleep apnea and pulmology is at [facility]. Prior to Admission medications    Medication Sig Start Date End Date Taking? Authorizing Provider   cyclobenzaprine (FLEXERIL) 5 mg tablet Take 1 Tab by mouth three (3) times daily as needed for Muscle Spasm(s). Indications: muscle spasm 11/23/20  Yes Angela Jackson MD   Shower Chair XX lawrence Bariatric shower chair. BMI is 54.5. Dx: radicular low back pain with weakness, imbalance. fall risk when standing without cane.  11/23/20  Yes Angela Jackson MD   furosemide (LASIX) 20 mg tablet TAKE 1 TO 2 TABLETS BY MOUTH EVERY MORNING AS NEEDED FOR EDEMA 11/16/20  Yes Angela Jackson MD   prazosin (MINIPRESS) 5 mg capsule TAKE 1 CAPSULE BY MOUTH EVERYDAY AT BEDTIME 10/22/20  Yes Angela Jackson MD   spironolactone (ALDACTONE) 25 mg tablet TAKE 1 TABLET BY MOUTH TWICE A DAY 10/21/20  Yes Angela Jackson MD   famotidine (PEPCID) 20 mg tablet TAKE 1 TABLET BY MOUTH TWICE A DAY 10/21/20  Yes Angela Jackson MD   fluticasone propionate (FLONASE) 50 mcg/actuation nasal spray INSTILL 2 SPRAYS IN EACH NOSTRIL BY INTRANASAL ROUTE ONCE DAILY AS NEEDED 9/21/20  Yes Angela Jackson MD   butalbital-acetaminophen-caff (FIORICET) -40 mg per capsule TAKE 1 CAPSULE(S) EVERY 4- 6 HOURS BY ORAL ROUTE AS NEEDED. 8/17/20  Yes Anselmo Denver, MD   ibuprofen (MOTRIN) 800 mg tablet TAKE 1 TABLET BY MOUTH THREE TIMES A DAY AS NEEDED 8/17/20  Yes Anselmo Denver, MD   butalbital-acetaminophen-caff (FIORICET) -40 mg per capsule Take  by mouth every four (4) hours as needed for Headache. Yes Provider, Historical   FLUoxetine (PROzac) 20 mg capsule Take  by mouth daily. Yes Provider, Historical   FLUoxetine (PROzac) 40 mg capsule Take  by mouth daily. Yes Provider, Historical   nicotine (NICODERM CQ) 14 mg/24 hr patch 1 Patch by TransDERmal route every twenty-four (24) hours. Yes Provider, Historical   nicotine (NICODERM CQ) 21 mg/24 hr 1 Patch by TransDERmal route every twenty-four (24) hours. Yes Provider, Historical   nicotine (NICODERM CQ) 7 mg/24 hr 1 Patch by TransDERmal route every twenty-four (24) hours. Yes Provider, Historical   nystatin (MYCOSTATIN) powder Apply  to affected area four (4) times daily. Yes Provider, Historical   oxyCODONE-acetaminophen (PERCOCET 7.5) 7.5-325 mg per tablet Take  by mouth. Yes Provider, Historical   fluticasone-umeclidinium-vilanterol (Trelegy Ellipta) 100-62.5-25 mcg inhaler Take 1 Puff by inhalation daily. Yes Provider, Historical   montelukast (SINGULAIR) 10 mg tablet Take 10 mg by mouth daily. Indications: inflammation of the nose due to an allergy, Controller Medication for Asthma   Yes Provider, Historical   topiramate (TOPAMAX) 100 mg tablet Take 100 mg by mouth two (2) times a day. Yes Provider, Historical   gabapentin (NEURONTIN) 600 mg tablet Take 600 mg by mouth three (3) times daily.  PT TAKES 2 TABS (1200MG) THREE TIMES PER WEEK   Yes Provider, Historical   albuterol-ipratropium (DUO-NEB) 2.5 mg-0.5 mg/3 ml nebu INHALE 1 VIAL VIA NEBULIZER EVERY 4-6 HOURS AS NEEDED 12/4/20   Anselmo Denver, MD   cloNIDine HCL (CATAPRES) 0.1 mg tablet TAKE 1 TABLET BY MOUTH TWICE A DAY 12/4/20   Anselmo Denver, MD   prazosin (MINIPRESS) 2 mg capsule TAKE 1 CAPSULE BY MOUTH IN THE MORNING AND ONE AT 5 PM 12/4/20   Roosevelt Mosqueda MD        Past Medical History:   Diagnosis Date    Arthritis     Asthma     Autoimmune disease ([facility] [address]     FIBROMYALGIA    Back pain     DDD    Chronic obstructive pulmonary disease (HCC)     Chronic pain     Depression     GERD (gastroesophageal reflux disease)     Headache     migraines    Hypertension     Morbid obesity (HCC)     Psychiatric disorder     DEPRESSION AND ANXIETY    Sleep apnea      Social History     Tobacco Use    Smoking status: Current Every Day Smoker     Packs/day: 1.00    Smokeless tobacco: Never Used   Substance Use Topics    Alcohol use: No    Drug use: No       Family History   Problem Relation Age of Onset    Asthma Mother     Hypertension Mother     Diabetes Brother     Hypertension Brother     Cancer Father         STOMACH    Depression Sister     Hypertension Daughter     Other Daughter         OVARIAN CYSTS    Anesth Problems Neg Hx        Review of Systems   Constitutional: Negative. Negative for chills, fever, malaise/fatigue and weight loss. HENT: Negative for congestion and hearing loss. Eyes: Negative for blurred vision and pain. Respiratory: Negative for cough and shortness of breath. Cardiovascular: Negative for chest pain, palpitations, orthopnea and leg swelling. Gastrointestinal: Negative for abdominal pain and nausea. Genitourinary: Negative for frequency. Musculoskeletal: Positive for joint pain and myalgias. Skin: Negative for rash. Neurological: Positive for tingling. Negative for dizziness, tremors, speech change, focal weakness, loss of consciousness and headaches. Endo/Heme/Allergies: Negative for polydipsia. Psychiatric/Behavioral: Negative for depression, memory loss and suicidal ideas. The patient is not nervous/anxious and does not have insomnia.         Visit Vitals  BP (!) 155/90 (BP 1 Location: Right arm, BP Patient Position: Sitting)   Pulse 90   Temp 98.2 °F (36.8 °C) (Tympanic)   Resp 20   Ht 5' 6\" (1.676 m)   Wt 338 lb (153.3 kg)   SpO2 98%   BMI 54.55 kg/m²        Physical Exam  Vitals signs and nursing note reviewed. Constitutional:       General: She is not in acute distress. Appearance: Normal appearance. She is obese. She is not ill-appearing. HENT:      Head: Normocephalic and atraumatic. Mouth/Throat:      Mouth: Mucous membranes are dry. Pharynx: No oropharyngeal exudate or posterior oropharyngeal erythema. Eyes:      General: No scleral icterus. Right eye: No discharge. Left eye: No discharge. Extraocular Movements: Extraocular movements intact. Conjunctiva/sclera: Conjunctivae normal.      Pupils: Pupils are equal, round, and reactive to light. Neck:      Musculoskeletal: No muscular tenderness. Cardiovascular:      Rate and Rhythm: Normal rate and regular rhythm. Pulses: Normal pulses. Heart sounds: Normal heart sounds. Pulmonary:      Effort: Pulmonary effort is normal.      Breath sounds: Normal breath sounds. Abdominal:      General: Abdomen is flat. There is no distension. Palpations: Abdomen is soft. There is no mass. Tenderness: There is no abdominal tenderness. There is no right CVA tenderness or left CVA tenderness. Musculoskeletal:      Right lower leg: No edema. Left lower leg: No edema. Lymphadenopathy:      Cervical: No cervical adenopathy. Skin:     General: Skin is warm and dry. Capillary Refill: Capillary refill takes less than 2 seconds. Neurological:      General: No focal deficit present. Mental Status: She is alert and oriented to person, place, and time. Cranial Nerves: No cranial nerve deficit.    Psychiatric:         Attention and Perception: Attention normal.         Mood and Affect: Mood normal.         Speech: Speech is tangential.         Behavior: Behavior normal.         Thought Content: Thought content normal. Thought content is not paranoid or delusional.         ASSESSMENT and PLAN  Diagnoses and all orders for this visit:    1. Degeneration of lumbar intervertebral disc  Comments:  refill low dose     2. Benign essential hypertension  -     METABOLIC PANEL, COMPREHENSIVE  -     CBC WITH AUTOMATED DIFF  -     LIPID PANEL    3. Multiple joint pain  Comments:  see your rheum please  Orders:  -     CBC WITH AUTOMATED DIFF  -     SED RATE (ESR)  -     CRP, HIGH SENSITIVITY    4. Bilateral carpal tunnel syndrome    Other orders  -     cyclobenzaprine (FLEXERIL) 5 mg tablet; Take 1 Tab by mouth three (3) times daily as needed for Muscle Spasm(s). Indications: muscle spasm  -     Shower Chair XX lawrence; Bariatric shower chair. BMI is 54.5. Dx: radicular low back pain with weakness, imbalance. fall risk when standing without cane.          Orders Placed This Encounter    METABOLIC PANEL, COMPREHENSIVE    CBC WITH AUTOMATED DIFF    LIPID PANEL    SED RATE (ESR)    CRP, HIGH SENSITIVITY    cyclobenzaprine (FLEXERIL) 5 mg tablet   [employer] Chair XX lawrence

## 2020-11-24 LAB
ALBUMIN SERPL-MCNC: 4.2 G/DL (ref 3.8–4.8)
ALBUMIN/GLOB SERPL: 1.6 {RATIO} (ref 1.2–2.2)
ALP SERPL-CCNC: 133 IU/L (ref 39–117)
ALT SERPL-CCNC: 9 IU/L (ref 0–32)
AST SERPL-CCNC: 13 IU/L (ref 0–40)
BASOPHILS # BLD AUTO: 0 X10E3/UL (ref 0–0.2)
BASOPHILS NFR BLD AUTO: 1 %
BILIRUB SERPL-MCNC: 0.4 MG/DL (ref 0–1.2)
BUN SERPL-MCNC: 11 MG/DL (ref 6–24)
BUN/CREAT SERPL: 10 (ref 9–23)
CALCIUM SERPL-MCNC: 9 MG/DL (ref 8.7–10.2)
CHLORIDE SERPL-SCNC: 108 MMOL/L (ref 96–106)
CHOLEST SERPL-MCNC: 193 MG/DL (ref 100–199)
CO2 SERPL-SCNC: 23 MMOL/L (ref 20–29)
CREAT SERPL-MCNC: 1.14 MG/DL (ref 0.57–1)
CRP SERPL HS-MCNC: 46.34 MG/L (ref 0–3)
EOSINOPHIL # BLD AUTO: 0.1 X10E3/UL (ref 0–0.4)
EOSINOPHIL NFR BLD AUTO: 1 %
ERYTHROCYTE [DISTWIDTH] IN BLOOD BY AUTOMATED COUNT: 15.9 % (ref 11.7–15.4)
ERYTHROCYTE [SEDIMENTATION RATE] IN BLOOD BY WESTERGREN METHOD: 43 MM/HR (ref 0–32)
GLOBULIN SER CALC-MCNC: 2.6 G/DL (ref 1.5–4.5)
GLUCOSE SERPL-MCNC: 99 MG/DL (ref 65–99)
HCT VFR BLD AUTO: 37.8 % (ref 34–46.6)
HDLC SERPL-MCNC: 38 MG/DL
HGB BLD-MCNC: 12.7 G/DL (ref 11.1–15.9)
IMM GRANULOCYTES # BLD AUTO: 0 X10E3/UL (ref 0–0.1)
IMM GRANULOCYTES NFR BLD AUTO: 0 %
LDLC SERPL CALC-MCNC: 139 MG/DL (ref 0–99)
LYMPHOCYTES # BLD AUTO: 2.5 X10E3/UL (ref 0.7–3.1)
LYMPHOCYTES NFR BLD AUTO: 44 %
MCH RBC QN AUTO: 26.1 PG (ref 26.6–33)
MCHC RBC AUTO-ENTMCNC: 33.6 G/DL (ref 31.5–35.7)
MCV RBC AUTO: 78 FL (ref 79–97)
MONOCYTES # BLD AUTO: 0.3 X10E3/UL (ref 0.1–0.9)
MONOCYTES NFR BLD AUTO: 5 %
NEUTROPHILS # BLD AUTO: 2.8 X10E3/UL (ref 1.4–7)
NEUTROPHILS NFR BLD AUTO: 49 %
PLATELET # BLD AUTO: 277 X10E3/UL (ref 150–450)
POTASSIUM SERPL-SCNC: 3.9 MMOL/L (ref 3.5–5.2)
PROT SERPL-MCNC: 6.8 G/DL (ref 6–8.5)
RBC # BLD AUTO: 4.87 X10E6/UL (ref 3.77–5.28)
SODIUM SERPL-SCNC: 142 MMOL/L (ref 134–144)
TRIGL SERPL-MCNC: 87 MG/DL (ref 0–149)
VLDLC SERPL CALC-MCNC: 16 MG/DL (ref 5–40)
WBC # BLD AUTO: 5.8 X10E3/UL (ref 3.4–10.8)

## 2020-12-02 ENCOUNTER — TELEPHONE (OUTPATIENT)
Dept: PRIMARY CARE CLINIC | Age: 45
End: 2020-12-02

## 2020-12-02 NOTE — TELEPHONE ENCOUNTER
Pt called and said she knows you told her to have her pulmonary doctor to fill her meds for her nebulizer but she don't not have an appt with them until Jan and she would like you to fill that.

## 2020-12-03 NOTE — TELEPHONE ENCOUNTER
Pt states pulmon.  Doctor wouldn't do it because she hadn't been there and they weren't the ones who prescribe the medication for her

## 2020-12-14 DIAGNOSIS — G44.229 CHRONIC TENSION-TYPE HEADACHE, NOT INTRACTABLE: ICD-10-CM

## 2020-12-27 RX ORDER — BUTALBITAL, ACETAMINOPHEN AND CAFFEINE 300; 40; 50 MG/1; MG/1; MG/1
CAPSULE ORAL
Qty: 40 CAP | Refills: 1 | Status: SHIPPED | OUTPATIENT
Start: 2020-12-27 | End: 2021-02-03 | Stop reason: SDUPTHER

## 2021-01-21 NOTE — PROGRESS NOTES
Normal  blood count, normal liver and kidney function and normal blood sugar.  goal under 70. Elevated inflammatory tests. Keep appt with your rheumatology please.     Send as lab letter

## 2021-01-29 RX ORDER — FAMOTIDINE 20 MG/1
TABLET, FILM COATED ORAL
Qty: 180 TAB | Refills: 1 | Status: SHIPPED | OUTPATIENT
Start: 2021-01-29 | End: 2021-02-03 | Stop reason: SDUPTHER

## 2021-02-03 ENCOUNTER — VIRTUAL VISIT (OUTPATIENT)
Dept: PRIMARY CARE CLINIC | Age: 46
End: 2021-02-03
Payer: COMMERCIAL

## 2021-02-03 DIAGNOSIS — F51.5 NIGHTMARE DISORDER: ICD-10-CM

## 2021-02-03 DIAGNOSIS — I10 BENIGN ESSENTIAL HYPERTENSION: ICD-10-CM

## 2021-02-03 DIAGNOSIS — M17.10 PRIMARY OSTEOARTHRITIS OF KNEE, UNSPECIFIED LATERALITY: ICD-10-CM

## 2021-02-03 DIAGNOSIS — G44.229 CHRONIC TENSION-TYPE HEADACHE, NOT INTRACTABLE: Primary | ICD-10-CM

## 2021-02-03 DIAGNOSIS — J45.40 MODERATE PERSISTENT ASTHMA WITHOUT COMPLICATION: Chronic | ICD-10-CM

## 2021-02-03 DIAGNOSIS — E66.01 MORBID OBESITY WITH BODY MASS INDEX (BMI) OF 50.0 TO 59.9 IN ADULT (HCC): ICD-10-CM

## 2021-02-03 DIAGNOSIS — Z99.89 USE OF CANE AS AMBULATORY AID: Chronic | ICD-10-CM

## 2021-02-03 PROCEDURE — 99214 OFFICE O/P EST MOD 30 MIN: CPT | Performed by: FAMILY MEDICINE

## 2021-02-03 RX ORDER — CLONIDINE HYDROCHLORIDE 0.1 MG/1
TABLET ORAL
Qty: 180 TAB | Refills: 1 | Status: SHIPPED | OUTPATIENT
Start: 2021-02-03 | End: 2022-01-27 | Stop reason: SDUPTHER

## 2021-02-03 RX ORDER — BUTALBITAL, ACETAMINOPHEN AND CAFFEINE 300; 40; 50 MG/1; MG/1; MG/1
CAPSULE ORAL
Qty: 40 CAP | Refills: 2 | Status: SHIPPED | OUTPATIENT
Start: 2021-02-03 | End: 2021-09-21

## 2021-02-03 RX ORDER — PRAZOSIN HYDROCHLORIDE 2 MG/1
CAPSULE ORAL
Qty: 180 CAP | Refills: 1 | Status: CANCELLED | OUTPATIENT
Start: 2021-02-03

## 2021-02-03 RX ORDER — MONTELUKAST SODIUM 10 MG/1
TABLET ORAL
Qty: 90 TAB | Refills: 1 | Status: SHIPPED | OUTPATIENT
Start: 2021-02-03 | End: 2021-09-17 | Stop reason: SDUPTHER

## 2021-02-03 RX ORDER — PRAZOSIN HYDROCHLORIDE 5 MG/1
1 CAPSULE ORAL
COMMUNITY
End: 2021-02-03 | Stop reason: SDUPTHER

## 2021-02-03 RX ORDER — PRAZOSIN HYDROCHLORIDE 5 MG/1
5 CAPSULE ORAL
Qty: 90 CAP | Refills: 1 | Status: SHIPPED | OUTPATIENT
Start: 2021-02-03 | End: 2021-09-17 | Stop reason: SDUPTHER

## 2021-02-03 RX ORDER — NITROFURANTOIN 25; 75 MG/1; MG/1
100 CAPSULE ORAL 2 TIMES DAILY
Qty: 20 CAP | Refills: 0 | Status: SHIPPED | OUTPATIENT
Start: 2021-02-03 | End: 2021-09-17

## 2021-02-03 RX ORDER — FLUOXETINE HYDROCHLORIDE 60 MG/1
TABLET, FILM COATED ORAL; ORAL
COMMUNITY
Start: 2021-01-15 | End: 2021-09-17

## 2021-02-03 RX ORDER — CYCLOBENZAPRINE HCL 5 MG
5 TABLET ORAL
Qty: 90 TAB | Refills: 3 | Status: SHIPPED | OUTPATIENT
Start: 2021-02-03

## 2021-02-03 RX ORDER — SPIRONOLACTONE 25 MG/1
TABLET ORAL
Qty: 180 TAB | Refills: 1 | Status: SHIPPED | OUTPATIENT
Start: 2021-02-03 | End: 2021-09-17 | Stop reason: SDUPTHER

## 2021-02-03 RX ORDER — ALBUTEROL SULFATE 90 UG/1
AEROSOL, METERED RESPIRATORY (INHALATION)
COMMUNITY
Start: 2021-01-15 | End: 2021-09-17 | Stop reason: SDUPTHER

## 2021-02-03 RX ORDER — FAMOTIDINE 20 MG/1
TABLET, FILM COATED ORAL
Qty: 180 TAB | Refills: 1 | Status: SHIPPED | OUTPATIENT
Start: 2021-02-03 | End: 2021-09-17 | Stop reason: SDUPTHER

## 2021-02-03 RX ORDER — PRAZOSIN HYDROCHLORIDE 2 MG/1
CAPSULE ORAL
Qty: 180 CAP | Refills: 1 | Status: SHIPPED | OUTPATIENT
Start: 2021-02-03 | End: 2021-09-17 | Stop reason: SDUPTHER

## 2021-02-03 RX ORDER — FUROSEMIDE 20 MG/1
TABLET ORAL
Qty: 180 TAB | Refills: 1 | Status: SHIPPED | OUTPATIENT
Start: 2021-02-03 | End: 2021-09-17 | Stop reason: SDUPTHER

## 2021-02-03 NOTE — PROGRESS NOTES
HISTORY OF PRESENT ILLNESS  THIS IS  VIDEO VISIT  OCCURRED WITH CONSENT FORM THE PATIENT AND PERFORMED THROUGH THE SECURE WEBSITE [employer]. ME.Pt is at home. Kerri Weber is a 50 y.o. female     GERD -  doing ok on famotidne. No pr bleed, or xs belching. Or dysphagia. Hypertension:  PT taking HCTZ>  Does not check  BP at home    cardiologist  11/202 Dr Dr Thiago Waddell want her to do a calcium score test.    Last Echo done 2018. Depression :  Followed by psychiatry now [facility] and he did not want to  Check the BP there. . WE are writing the    prazosin  for nightmares and the mood  bc that doctor declined but is OK with us doing it she reports . Says she has no urinary dysfunction. .      Sleep apnea and pulmology is at [facility]. They want her to discuss the fluid retention with us. On lasix she is taking the 20 mg and  If she has extra she takes 2 . They need a shower bench bc this is  better than the shower chair. bc those are too small. The insurance co is  Calling  Her  To make sure to stay on to of it. Prior to Admission medications    Medication Sig Start Date End Date Taking? Authorizing Provider   Shower Chair XX lawrence SHOWER BENCH NEEDED not chair. Pt has morbid obesity  With radicular back pain, arthritis and risk of falls. Uses cane for ambulation. 2/14/21  Yes Rasheed Pérez MD   butalbital-acetaminophen-caff (FIORICET) -40 mg per capsule TAKE 1 CAPSULE(S) EVERY 4- 6 HOURS BY ORAL ROUTE AS NEEDED.   Indications: tension headache 2/3/21  Yes Rasheed Pérez MD   cloNIDine HCL (CATAPRES) 0.1 mg tablet TAKE 1 TABLET BY MOUTH TWICE A DAY 2/3/21  Yes Rasheed Pérez MD   FLUoxetine 60 mg tab TAKE 1 TABLET BY MOUTH EVERY DAY IN THE MORNING 1/15/21  Yes Provider, Historical   albuterol (PROVENTIL HFA, VENTOLIN HFA, PROAIR HFA) 90 mcg/actuation inhaler INHALE 2 PUFFS EVERY 4 TO 6 HOURS AS NEEDED 1/15/21  Yes Provider, Historical   famotidine (PEPCID) 20 mg tablet TAKE 1 TABLET BY MOUTH TWICE A DAY 2/3/21  Yes Albania Hanks MD   furosemide (LASIX) 20 mg tablet TAKE 1 TO 2 TABLETS BY MOUTH EVERY MORNING AS NEEDED FOR EDEMA 2/3/21  Yes Albania Hanks MD   montelukast (SINGULAIR) 10 mg tablet TAKE 1 TABLET BY MOUTH EVERY DAY 2/3/21  Yes Albania Hanks MD   prazosin (MINIPRESS) 5 mg capsule Take 1 Cap by mouth nightly. 2/3/21  Yes Albania Hanks MD   spironolactone (ALDACTONE) 25 mg tablet TAKE 1 TABLET BY MOUTH TWICE A DAY 2/3/21  Yes Albania Hanks MD   cyclobenzaprine (FLEXERIL) 5 mg tablet Take 1 Tab by mouth three (3) times daily as needed for Muscle Spasm(s). Indications: muscle spasm 2/3/21  Yes Albania Hanks MD   nitrofurantoin, macrocrystal-monohydrate, (Macrobid) 100 mg capsule Take 1 Cap by mouth two (2) times a day. 2/3/21  Yes Albania Hanks MD   prazosin (MINIPRESS) 2 mg capsule TAKE 1 CAPSULE BY MOUTH IN THE MORNING AND ONE AT 5 PM  Indications: high blood pressure, posttraumatic stress syndrome 2/3/21  Yes Albania Hanks MD   ibuprofen (MOTRIN) 800 mg tablet TAKE 1 TABLET BY MOUTH THREE TIMES A DAY AS NEEDED 1/20/21  Yes Albania Hanks MD   albuterol-ipratropium (DUO-NEB) 2.5 mg-0.5 mg/3 ml nebu INHALE 1 VIAL VIA NEBULIZER EVERY 4-6 HOURS AS NEEDED 12/4/20  Yes Albania Hanks MD   fluticasone propionate (FLONASE) 50 mcg/actuation nasal spray INSTILL 2 SPRAYS IN EACH NOSTRIL BY INTRANASAL ROUTE ONCE DAILY AS NEEDED 9/21/20  Yes Albania Hanks MD   nicotine (NICODERM CQ) 14 mg/24 hr patch 1 Patch by TransDERmal route every twenty-four (24) hours. Yes Provider, Historical   oxyCODONE-acetaminophen (PERCOCET 7.5) 7.5-325 mg per tablet Take 1 Tab by mouth every six (6) hours as needed. Yes Provider, Historical   fluticasone-umeclidinium-vilanterol (Trelegy Ellipta) 100-62.5-25 mcg inhaler Take 1 Puff by inhalation daily.    Yes Provider, Historical   topiramate (TOPAMAX) 100 mg tablet Take 100 mg by mouth two (2) times a day.   Yes Provider, Historical   gabapentin (NEURONTIN) 600 mg tablet Take 600 mg by mouth three (3) times daily. PT TAKES 2 TABS (1200MG) THREE TIMES PER WEEK   Yes Provider, Historical   nystatin (MYCOSTATIN) powder Apply  to affected area four (4) times daily. Provider, Historical        Past Medical History:   Diagnosis Date    Arthritis     Asthma     Autoimmune disease ([facility] [address]     FIBROMYALGIA    Back pain     DDD    Chronic obstructive pulmonary disease (HCC)     Chronic pain     Depression     GERD (gastroesophageal reflux disease)     Headache     migraines    Hypertension     Morbid obesity (HCC)     Morbid obesity with body mass index (BMI) of 50.0 to 59.9 in adult [facility]) 8/13/2020    Psychiatric disorder     DEPRESSION AND ANXIETY    Sleep apnea      Social History     Tobacco Use    Smoking status: Current Every Day Smoker     Packs/day: 1.00    Smokeless tobacco: Never Used   Substance Use Topics    Alcohol use: No    Drug use: No       Family History   Problem Relation Age of Onset    Asthma Mother     Hypertension Mother     Diabetes Brother     Hypertension Brother     Cancer Father         STOMACH    Depression Sister     Hypertension Daughter     Other Daughter         OVARIAN CYSTS    Anesth Problems Neg Hx        Review of Systems   Constitutional: Negative. Negative for chills, fever, malaise/fatigue and weight loss. HENT: Negative for congestion and hearing loss. Eyes: Negative for blurred vision and pain. Respiratory: Negative for cough and shortness of breath. Cardiovascular: Negative for chest pain, palpitations, orthopnea and leg swelling. Gastrointestinal: Negative for abdominal pain and nausea. Genitourinary: Negative for frequency. Musculoskeletal: Positive for joint pain and myalgias. Skin: Negative for rash. Neurological: Positive for tingling.  Negative for dizziness, tremors, speech change, focal weakness, loss of consciousness and headaches. Endo/Heme/Allergies: Negative for polydipsia. Psychiatric/Behavioral: Negative for depression, memory loss and suicidal ideas. The patient is not nervous/anxious and does not have insomnia. There were no vitals taken for this visit. Physical Exam  Nursing note reviewed. Constitutional:       Appearance: Normal appearance. She is obese. HENT:      Head: Normocephalic and atraumatic. Eyes:      Extraocular Movements: Extraocular movements intact. Conjunctiva/sclera: Conjunctivae normal.   Pulmonary:      Effort: Pulmonary effort is normal. No respiratory distress. Neurological:      General: No focal deficit present. Mental Status: She is alert and oriented to person, place, and time. Psychiatric:         Mood and Affect: Mood normal.         Behavior: Behavior normal.         Thought Content: Thought content normal.         ASSESSMENT and PLAN  Diagnoses and all orders for this visit:    1. Chronic tension-type headache, not intractable  -     butalbital-acetaminophen-caff (FIORICET) -40 mg per capsule; TAKE 1 CAPSULE(S) EVERY 4- 6 HOURS BY ORAL ROUTE AS NEEDED. Indications: tension headache    2. Benign essential hypertension    3. Nightmare disorder  -     prazosin (MINIPRESS) 2 mg capsule; TAKE 1 CAPSULE BY MOUTH IN THE MORNING AND ONE AT 5 PM  Indications: high blood pressure, posttraumatic stress syndrome    4. Moderate persistent asthma without complication  Comments:  stable , sinus ok on singulair as well    5. Primary osteoarthritis of knee, unspecified laterality    6. Use of cane as ambulatory aid  Comments:  OK ro get shower bench bc of risk of fall in shower. previously assessed but shower chair  was ordered and her weight is the issue. needs bench not chair.      7. Morbid obesity with body mass index (BMI) of 50.0 to 59.9 in adult [facility])    Other orders  -     cloNIDine HCL (CATAPRES) 0.1 mg tablet; TAKE 1 TABLET BY MOUTH TWICE A DAY  - famotidine (PEPCID) 20 mg tablet; TAKE 1 TABLET BY MOUTH TWICE A DAY  -     furosemide (LASIX) 20 mg tablet; TAKE 1 TO 2 TABLETS BY MOUTH EVERY MORNING AS NEEDED FOR EDEMA  -     montelukast (SINGULAIR) 10 mg tablet; TAKE 1 TABLET BY MOUTH EVERY DAY  -     prazosin (MINIPRESS) 5 mg capsule; Take 1 Cap by mouth nightly. -     spironolactone (ALDACTONE) 25 mg tablet; TAKE 1 TABLET BY MOUTH TWICE A DAY  -     cyclobenzaprine (FLEXERIL) 5 mg tablet; Take 1 Tab by mouth three (3) times daily as needed for Muscle Spasm(s). Indications: muscle spasm  -     nitrofurantoin, macrocrystal-monohydrate, (Macrobid) 100 mg capsule; Take 1 Cap by mouth two (2) times a day. -     Shower Chair XX lawrence; SHOWER BENCH NEEDED not chair. Pt has morbid obesity  With radicular back pain, arthritis and risk of falls. Uses cane for ambulation. Orders Placed This Encounter    butalbital-acetaminophen-caff (FIORICET) -40 mg per capsule    cloNIDine HCL (CATAPRES) 0.1 mg tablet    FLUoxetine 60 mg tab    albuterol (PROVENTIL HFA, VENTOLIN HFA, PROAIR HFA) 90 mcg/actuation inhaler    DISCONTD: prazosin (MINIPRESS) 5 mg capsule    famotidine (PEPCID) 20 mg tablet    furosemide (LASIX) 20 mg tablet    montelukast (SINGULAIR) 10 mg tablet    prazosin (MINIPRESS) 5 mg capsule    spironolactone (ALDACTONE) 25 mg tablet    cyclobenzaprine (FLEXERIL) 5 mg tablet    nitrofurantoin, macrocrystal-monohydrate, (Macrobid) 100 mg capsule    prazosin (MINIPRESS) 2 mg capsule    Shower Chair XX lawrence     Follow-up and Dispositions    · Return in 4 months (on 6/3/2021) for Chronic office visit.

## 2021-02-14 PROBLEM — E66.01 MORBID OBESITY WITH BODY MASS INDEX (BMI) OF 50.0 TO 59.9 IN ADULT (HCC): Status: ACTIVE | Noted: 2020-08-13

## 2021-02-14 PROBLEM — G44.229 CHRONIC TENSION-TYPE HEADACHE, NOT INTRACTABLE: Status: ACTIVE | Noted: 2020-08-13

## 2021-02-14 PROBLEM — F51.5 NIGHTMARE DISORDER: Status: ACTIVE | Noted: 2021-02-14

## 2021-02-14 PROBLEM — Z99.89 USE OF CANE AS AMBULATORY AID: Status: ACTIVE | Noted: 2021-02-14

## 2021-03-03 ENCOUNTER — TELEPHONE (OUTPATIENT)
Dept: PRIMARY CARE CLINIC | Age: 46
End: 2021-03-03

## 2021-03-03 NOTE — TELEPHONE ENCOUNTER
Patient called stating she is still waiting on her shower bench, the DME have not received the order.

## 2021-04-09 RX ORDER — FLUTICASONE PROPIONATE 50 MCG
SPRAY, SUSPENSION (ML) NASAL
Qty: 1 BOTTLE | Refills: 1 | Status: SHIPPED | OUTPATIENT
Start: 2021-04-09 | End: 2022-01-27 | Stop reason: SDUPTHER

## 2021-04-09 RX ORDER — IBUPROFEN 800 MG/1
TABLET ORAL
Qty: 60 TAB | Refills: 2 | Status: SHIPPED | OUTPATIENT
Start: 2021-04-09

## 2021-09-17 ENCOUNTER — OFFICE VISIT (OUTPATIENT)
Dept: PRIMARY CARE CLINIC | Age: 46
End: 2021-09-17
Payer: COMMERCIAL

## 2021-09-17 VITALS
WEIGHT: 293 LBS | SYSTOLIC BLOOD PRESSURE: 126 MMHG | TEMPERATURE: 97.4 F | BODY MASS INDEX: 47.09 KG/M2 | OXYGEN SATURATION: 98 % | HEART RATE: 94 BPM | DIASTOLIC BLOOD PRESSURE: 78 MMHG | RESPIRATION RATE: 18 BRPM | HEIGHT: 66 IN

## 2021-09-17 DIAGNOSIS — R07.89 OTHER CHEST PAIN: ICD-10-CM

## 2021-09-17 DIAGNOSIS — J45.40 MODERATE PERSISTENT ASTHMA, UNCOMPLICATED: ICD-10-CM

## 2021-09-17 DIAGNOSIS — I10 ESSENTIAL HYPERTENSION: Primary | ICD-10-CM

## 2021-09-17 DIAGNOSIS — F32.A DEPRESSION, UNSPECIFIED DEPRESSION TYPE: ICD-10-CM

## 2021-09-17 DIAGNOSIS — E66.01 MORBID OBESITY WITH BODY MASS INDEX (BMI) OF 50.0 TO 59.9 IN ADULT (HCC): ICD-10-CM

## 2021-09-17 DIAGNOSIS — M25.50 MULTIPLE JOINT PAIN: ICD-10-CM

## 2021-09-17 DIAGNOSIS — F51.5 NIGHTMARE DISORDER: ICD-10-CM

## 2021-09-17 DIAGNOSIS — Z87.891 PERSONAL HISTORY OF NICOTINE DEPENDENCE: ICD-10-CM

## 2021-09-17 DIAGNOSIS — K21.9 GASTROESOPHAGEAL REFLUX DISEASE, UNSPECIFIED WHETHER ESOPHAGITIS PRESENT: ICD-10-CM

## 2021-09-17 PROBLEM — B37.2 CANDIDIASIS OF SKIN: Status: RESOLVED | Noted: 2020-08-13 | Resolved: 2021-09-17

## 2021-09-17 PROCEDURE — 99214 OFFICE O/P EST MOD 30 MIN: CPT | Performed by: FAMILY MEDICINE

## 2021-09-17 RX ORDER — FLUOXETINE HYDROCHLORIDE 40 MG/1
40 CAPSULE ORAL DAILY
Qty: 90 CAPSULE | Refills: 1
Start: 2021-09-17

## 2021-09-17 RX ORDER — IPRATROPIUM BROMIDE AND ALBUTEROL SULFATE 2.5; .5 MG/3ML; MG/3ML
SOLUTION RESPIRATORY (INHALATION)
Qty: 360 ML | Refills: 0 | Status: SHIPPED | OUTPATIENT
Start: 2021-09-17 | End: 2021-12-21 | Stop reason: SDUPTHER

## 2021-09-17 RX ORDER — PRAZOSIN HYDROCHLORIDE 5 MG/1
5 CAPSULE ORAL
Qty: 90 CAPSULE | Refills: 1 | Status: SHIPPED | OUTPATIENT
Start: 2021-09-17 | End: 2022-02-17

## 2021-09-17 RX ORDER — FAMOTIDINE 20 MG/1
TABLET, FILM COATED ORAL
Qty: 180 TABLET | Refills: 1 | Status: SHIPPED | OUTPATIENT
Start: 2021-09-17 | End: 2022-05-19

## 2021-09-17 RX ORDER — MONTELUKAST SODIUM 10 MG/1
TABLET ORAL
Qty: 90 TABLET | Refills: 1 | Status: SHIPPED | OUTPATIENT
Start: 2021-09-17 | End: 2022-03-29

## 2021-09-17 RX ORDER — IBUPROFEN 200 MG
1 TABLET ORAL EVERY 24 HOURS
Qty: 30 PATCH | Refills: 0 | Status: SHIPPED | OUTPATIENT
Start: 2021-09-17 | End: 2022-01-27 | Stop reason: SDUPTHER

## 2021-09-17 RX ORDER — FUROSEMIDE 20 MG/1
TABLET ORAL
Qty: 180 TABLET | Refills: 1 | Status: SHIPPED | OUTPATIENT
Start: 2021-09-17 | End: 2022-05-19

## 2021-09-17 RX ORDER — PRAZOSIN HYDROCHLORIDE 2 MG/1
CAPSULE ORAL
Qty: 180 CAPSULE | Refills: 1 | Status: SHIPPED | OUTPATIENT
Start: 2021-09-17 | End: 2022-07-24

## 2021-09-17 RX ORDER — ALBUTEROL SULFATE 90 UG/1
AEROSOL, METERED RESPIRATORY (INHALATION)
Qty: 18 G | Refills: 5 | Status: SHIPPED | OUTPATIENT
Start: 2021-09-17

## 2021-09-17 RX ORDER — SPIRONOLACTONE 25 MG/1
TABLET ORAL
Qty: 180 TABLET | Refills: 1 | Status: SHIPPED | OUTPATIENT
Start: 2021-09-17 | End: 2022-01-27 | Stop reason: SDUPTHER

## 2021-09-17 NOTE — PROGRESS NOTES
HPI     Chief Complaint:   Chief Complaint   Patient presents with    Medication Refill     Medication refills         HPI:  Nicolás Salomon is a 48 y.o. female. Patient is new to me. Chart and previous notes reviewed. Depression: Followed by psychiatry and [facility]. Smokin/2 ppd. Is using the patch intermittently. Leg swelling: chronic, uses lasix daily. Says she was never told why edema occurs. No Hx of heart failure. She says she has had chest pain/shortness of breath over time throughout the last 2 years, saw Cardiology and was told everything was fine. Chest pain: usually with activity. Associated with shortness of breath sometimes. Lasts 5-30 minutes, goes away with rest.     Nightmares: patient is on prazosin for this. Per previous PCP's note, Psych was not comfortable writing the script but okay'd her being on it if PCP wrote for it? ?    Sleep Apnea: Followed with pulm. Chronic Back pain: sees pain management at [facility]. HTN: on Aldactone. Prazosin is for nightmares. BP at goal.     Review of Systems   Constitutional: Negative for chills, fever and weight loss. Respiratory: Positive for shortness of breath. Negative for cough. Cardiovascular: Positive for chest pain. Negative for palpitations. Musculoskeletal: Positive for back pain, joint pain and myalgias. Negative for falls. Psychiatric/Behavioral: Positive for depression. Negative for hallucinations and suicidal ideas. Reviewed PmHx, FmHx, SocHx as well as meds and allergies, updated and dated in the chart.     Past Medical History:   Diagnosis Date    Arthritis     Asthma     Autoimmune disease ([facility] [address]     FIBROMYALGIA    Back pain     DDD    Chronic obstructive pulmonary disease (HCC)     Chronic pain     Depression     GERD (gastroesophageal reflux disease)     Headache     migraines    Hypertension     Morbid obesity ([facility] [address]     Morbid obesity with body mass index (BMI) of 50.0 to 59.9 in adult [facility]) 8/13/2020    Psychiatric disorder     DEPRESSION AND ANXIETY    Sleep apnea      Social History     Tobacco Use    Smoking status: Current Every Day Smoker     Packs/day: 1.00    Smokeless tobacco: Never Used   Substance Use Topics    Alcohol use: No    Drug use: No       Current Outpatient Medications on File Prior to Visit   Medication Sig Dispense Refill    ibuprofen (MOTRIN) 800 mg tablet TAKE 1 TABLET BY MOUTH THREE TIMES A DAY AS NEEDED 60 Tab 2    fluticasone propionate (FLONASE) 50 mcg/actuation nasal spray INSTILL 2 SPRAYS IN EACH NOSTRIL BY INTRANASAL ROUTE ONCE DAILY AS NEEDED 1 Bottle 1    butalbital-acetaminophen-caff (FIORICET) -40 mg per capsule TAKE 1 CAPSULE(S) EVERY 4- 6 HOURS BY ORAL ROUTE AS NEEDED. Indications: tension headache 40 Cap 2    cloNIDine HCL (CATAPRES) 0.1 mg tablet TAKE 1 TABLET BY MOUTH TWICE A  Tab 1    cyclobenzaprine (FLEXERIL) 5 mg tablet Take 1 Tab by mouth three (3) times daily as needed for Muscle Spasm(s). Indications: muscle spasm 90 Tab 3    oxyCODONE-acetaminophen (PERCOCET 7.5) 7.5-325 mg per tablet Take 1 Tab by mouth every six (6) hours as needed.  fluticasone-umeclidinium-vilanterol (Trelegy Ellipta) 100-62.5-25 mcg inhaler Take 1 Puff by inhalation daily.  topiramate (TOPAMAX) 100 mg tablet Take 100 mg by mouth two (2) times a day.  Shower Chair XX lawrence SHOWER BENCH NEEDED not chair. Pt has morbid obesity  With radicular back pain, arthritis and risk of falls. Uses cane for ambulation.  (Patient not taking: Reported on 9/17/2021) 1 Each 0    [DISCONTINUED] FLUoxetine 60 mg tab TAKE 1 TABLET BY MOUTH EVERY DAY IN THE MORNING      [DISCONTINUED] albuterol (PROVENTIL HFA, VENTOLIN HFA, PROAIR HFA) 90 mcg/actuation inhaler INHALE 2 PUFFS EVERY 4 TO 6 HOURS AS NEEDED      [DISCONTINUED] famotidine (PEPCID) 20 mg tablet TAKE 1 TABLET BY MOUTH TWICE A  Tab 1    [DISCONTINUED] furosemide (LASIX) 20 mg tablet TAKE 1 TO 2 TABLETS BY MOUTH EVERY MORNING AS NEEDED FOR EDEMA 180 Tab 1    [DISCONTINUED] montelukast (SINGULAIR) 10 mg tablet TAKE 1 TABLET BY MOUTH EVERY DAY 90 Tab 1    [DISCONTINUED] prazosin (MINIPRESS) 5 mg capsule Take 1 Cap by mouth nightly. 90 Cap 1    [DISCONTINUED] spironolactone (ALDACTONE) 25 mg tablet TAKE 1 TABLET BY MOUTH TWICE A  Tab 1    [DISCONTINUED] nitrofurantoin, macrocrystal-monohydrate, (Macrobid) 100 mg capsule Take 1 Cap by mouth two (2) times a day. (Patient not taking: Reported on 9/17/2021) 20 Cap 0    [DISCONTINUED] prazosin (MINIPRESS) 2 mg capsule TAKE 1 CAPSULE BY MOUTH IN THE MORNING AND ONE AT 5 PM  Indications: high blood pressure, posttraumatic stress syndrome 180 Cap 1    [DISCONTINUED] albuterol-ipratropium (DUO-NEB) 2.5 mg-0.5 mg/3 ml nebu INHALE 1 VIAL VIA NEBULIZER EVERY 4-6 HOURS AS NEEDED 360 mL 0    [DISCONTINUED] nicotine (NICODERM CQ) 14 mg/24 hr patch 1 Patch by TransDERmal route every twenty-four (24) hours.  [DISCONTINUED] nystatin (MYCOSTATIN) powder Apply  to affected area four (4) times daily.  [DISCONTINUED] gabapentin (NEURONTIN) 600 mg tablet Take 600 mg by mouth three (3) times daily. PT TAKES 2 TABS (1200MG) THREE TIMES PER WEEK       No current facility-administered medications on file prior to visit. Allergies   Allergen Reactions    Cymbalta [Duloxetine] Other (comments)    Lyrica [Pregabalin] Rash    Pcn [Penicillins] Itching    Tramadol Other (comments)     Heart fluttering          Objective     Visit Vitals  /78 (BP 1 Location: Left upper arm, BP Patient Position: Sitting, BP Cuff Size: Adult)   Pulse 94   Temp 97.4 °F (36.3 °C) (Temporal)   Resp 18   Ht 5' 6\" (1.676 m)   Wt (!) 362 lb 9.6 oz (164.5 kg)   SpO2 98%   BMI 58.53 kg/m²     Physical Exam  Vitals and nursing note reviewed. Constitutional:       Appearance: Normal appearance. She is obese. HENT:      Head: Normocephalic and atraumatic.       Right Ear: External ear normal.      Left Ear: External ear normal.   Cardiovascular:      Rate and Rhythm: Normal rate and regular rhythm. Heart sounds: Normal heart sounds. Pulmonary:      Effort: Pulmonary effort is normal. No respiratory distress. Breath sounds: Normal breath sounds. Musculoskeletal:      Comments: Walks with cane   Neurological:      General: No focal deficit present. Mental Status: She is alert and oriented to person, place, and time. Psychiatric:      Comments: Somewhat flat affect. Pleasant. No HI/SI. Assessment and Plan     Diagnoses and all orders for this visit:    1. Essential hypertension: at goal and reportedly at goal at home as well. Continue routine. -     spironolactone (ALDACTONE) 25 mg tablet; TAKE 1 TABLET BY MOUTH TWICE A DAY  -     furosemide (LASIX) 20 mg tablet; TAKE 1 TO 2 TABLETS BY MOUTH EVERY MORNING AS NEEDED FOR EDEMA  -     CBC WITH AUTOMATED DIFF  -     METABOLIC PANEL, COMPREHENSIVE    2. Nightmare disorder: stable on current regimen. -     prazosin (MINIPRESS) 5 mg capsule; Take 1 Capsule by mouth nightly. -     prazosin (MINIPRESS) 2 mg capsule; TAKE 1 CAPSULE BY MOUTH IN THE MORNING AND ONE AT 5 PM  Indications: high blood pressure, posttraumatic stress syndrome    3. Morbid obesity with body mass index (BMI) of 50.0 to 59.9 in adult [facility]): patient declines info on weight loss surgery. May be hard to get her more active given joint issues. 4. Multiple joint pain: followed by pain and ortho. 5. Moderate persistent asthma, uncomplicated: stable  -     albuterol (PROVENTIL HFA, VENTOLIN HFA, PROAIR HFA) 90 mcg/actuation inhaler; INHALE 2 PUFFS EVERY 4 TO 6 HOURS AS NEEDED  -     montelukast (SINGULAIR) 10 mg tablet; TAKE 1 TABLET BY MOUTH EVERY DAY  -     albuterol-ipratropium (DUO-NEB) 2.5 mg-0.5 mg/3 ml nebu; INHALE 1 VIAL VIA NEBULIZER EVERY 4-6 HOURS AS NEEDED    6. Other chest pain: cardiac symptoms per history.  Will have cards re-evaluate.   -     REFERRAL TO CARDIOLOGY    7. Personal history of nicotine dependence: will try patches. Advised not to smoke while on, and to take off at night.   -     nicotine (NICODERM CQ) 21 mg/24 hr; 1 Patch by TransDERmal route every twenty-four (24) hours for 30 days. Take off at night    8. Gastroesophageal reflux disease, unspecified whether esophagitis present: stable. -     famotidine (PEPCID) 20 mg tablet; TAKE 1 TABLET BY MOUTH TWICE A DAY    9. Depression, unspecified depression type: followed by psych.   -     FLUoxetine (PROzac) 40 mg capsule; Take 1 Capsule by mouth daily. Medication Side Effects and Warnings were discussed with patient. Patient Labs were reviewed and or requested. Patient Past Records were reviewed and or requested. Follow-up and Dispositions    Return in about 1 month (around 10/17/2021) for smoking cessation. I have discussed the diagnosis with the patient and the intended plan as seen in the above orders. The patient has received an after-visit summary and questions were answered concerning future plans. I have discussed medication side effects and warnings with the patient as well.       Artis Chow MD  [address] [place] [address]

## 2021-09-17 NOTE — PROGRESS NOTES
Visit Vitals  BP (!) 146/79 (BP 1 Location: Left upper arm, BP Patient Position: Sitting, BP Cuff Size: Large adult)   Pulse 94   Temp 97.4 °F (36.3 °C) (Temporal)   Resp 18   Ht 5' 6\" (1.676 m)   Wt (!) 362 lb 9.6 oz (164.5 kg)   SpO2 98%   BMI 58.53 kg/m²     Chief Complaint   Patient presents with    Medication Refill     Medication refills      1. Have you been to the ER, urgent care clinic since your last visit? Hospitalized since your last visit? No    2. Have you seen or consulted any other health care providers outside of the [address] [place] [address] since your last visit? Include any pap smears or colon screening.  Yes

## 2021-09-18 DIAGNOSIS — G44.229 CHRONIC TENSION-TYPE HEADACHE, NOT INTRACTABLE: ICD-10-CM

## 2021-09-20 ENCOUNTER — TRANSCRIBE ORDER (OUTPATIENT)
Dept: SCHEDULING | Age: 46
End: 2021-09-20

## 2021-09-20 DIAGNOSIS — Z79.891 LONG-TERM CURRENT USE OF OPIATE ANALGESIC: ICD-10-CM

## 2021-09-20 DIAGNOSIS — M54.10 RADICULOPATHY, SITE UNSPECIFIED: ICD-10-CM

## 2021-09-20 DIAGNOSIS — M54.50 LOW BACK PAIN: Primary | ICD-10-CM

## 2021-09-21 RX ORDER — BUTALBITAL, ACETAMINOPHEN AND CAFFEINE 300; 40; 50 MG/1; MG/1; MG/1
CAPSULE ORAL
Qty: 40 CAPSULE | Refills: 1 | Status: SHIPPED | OUTPATIENT
Start: 2021-09-21 | End: 2021-11-11 | Stop reason: SDUPTHER

## 2021-10-19 ENCOUNTER — TRANSCRIBE ORDER (OUTPATIENT)
Dept: REGISTRATION | Age: 46
End: 2021-10-19

## 2021-10-19 ENCOUNTER — HOSPITAL ENCOUNTER (OUTPATIENT)
Dept: GENERAL RADIOLOGY | Age: 46
Discharge: HOME | End: 2021-10-19
Payer: MEDICAID

## 2021-10-19 DIAGNOSIS — M54.81 OCCIPITAL NEURALGIA: ICD-10-CM

## 2021-10-19 DIAGNOSIS — G43.901 MIGRAINE, UNSPECIFIED, NOT INTRACTABLE, WITH STATUS MIGRAINOSUS: ICD-10-CM

## 2021-10-19 DIAGNOSIS — M54.2 CERVICALGIA: ICD-10-CM

## 2021-10-19 DIAGNOSIS — Z79.891 LONG TERM PRESCRIPTION OPIATE USE: ICD-10-CM

## 2021-10-19 DIAGNOSIS — M54.12 RADICULOPATHY OF CERVICAL SPINE: ICD-10-CM

## 2021-10-19 DIAGNOSIS — M54.2 CERVICALGIA: Primary | ICD-10-CM

## 2021-10-19 PROCEDURE — 72050 X-RAY EXAM NECK SPINE 4/5VWS: CPT

## 2021-10-22 ENCOUNTER — OFFICE VISIT (OUTPATIENT)
Dept: PRIMARY CARE CLINIC | Age: 46
End: 2021-10-22
Payer: MEDICAID

## 2021-10-22 VITALS
BODY MASS INDEX: 47.09 KG/M2 | TEMPERATURE: 97.4 F | HEIGHT: 66 IN | DIASTOLIC BLOOD PRESSURE: 82 MMHG | RESPIRATION RATE: 18 BRPM | HEART RATE: 83 BPM | SYSTOLIC BLOOD PRESSURE: 130 MMHG | WEIGHT: 293 LBS | OXYGEN SATURATION: 97 %

## 2021-10-22 DIAGNOSIS — Z72.0 TOBACCO ABUSE: ICD-10-CM

## 2021-10-22 DIAGNOSIS — E66.01 MORBID OBESITY WITH BMI OF 50.0-59.9, ADULT (HCC): Primary | ICD-10-CM

## 2021-10-22 DIAGNOSIS — I10 ESSENTIAL HYPERTENSION: ICD-10-CM

## 2021-10-22 DIAGNOSIS — B35.3 TINEA PEDIS OF BOTH FEET: ICD-10-CM

## 2021-10-22 DIAGNOSIS — M17.0 TRICOMPARTMENT OSTEOARTHRITIS OF KNEES, BILATERAL: ICD-10-CM

## 2021-10-22 PROCEDURE — 99214 OFFICE O/P EST MOD 30 MIN: CPT | Performed by: FAMILY MEDICINE

## 2021-10-22 RX ORDER — DUPILUMAB 300 MG/2ML
INJECTION, SOLUTION SUBCUTANEOUS
COMMUNITY
Start: 2021-08-23

## 2021-10-22 RX ORDER — CHLORPHENIRAMINE MALEATE 4 MG
TABLET ORAL 2 TIMES DAILY
Qty: 15 G | Refills: 1 | Status: SHIPPED | OUTPATIENT
Start: 2021-10-22 | End: 2022-01-27 | Stop reason: ALTCHOICE

## 2021-10-22 NOTE — PROGRESS NOTES
HPI     Chief Complaint   Patient presents with    Follow-up     Smoking. Right leg, pain Doctor        HPI:  Laura Solano is a 48 y.o. female who is here for follow up. Smoking Cessation: has not committed fully to quitting. Is smoking 1/2 ppd currently. Not really wearing the patch. Chronic pain: Bilateral foot pain and knee pain. Ambulates with pain. Has a pain management (Ms. Ward) and orthopedic doctor. Xray in 2019 showed tricompartmental deterioration in knees. Morbid Obesity: Patient knows she needs to lose weight, does not want that to be the focus of all conversations. Bilateral Tricompartmental Knee Arthritis: Does not want to do surgery period. Wants pain management. Wants a second opinion from Pain Management. Says they often just tell her she needs to lose weight and negate her concerns for pain; however, she is clear on what she expects and does not want to ever due to surgery. HTN: at goal. Taking medications. No chest pain or palpitations. Rash on feet: circular lesions, itch. Has not tried anything. No drainage, not warm. Allergies   Allergen Reactions    Cymbalta [Duloxetine] Other (comments)    Lyrica [Pregabalin] Rash    Pcn [Penicillins] Itching    Tramadol Other (comments)     Heart fluttering       Current Outpatient Medications   Medication Sig    clotrimazole (LOTRIMIN) 1 % topical cream Apply  to affected area two (2) times a day.  butalbital-acetaminophen-caff (FIORICET) -40 mg per capsule TAKE 1 CAPSULE(S) EVERY 4- 6 HOURS BY ORAL ROUTE AS NEEDED.  albuterol (PROVENTIL HFA, VENTOLIN HFA, PROAIR HFA) 90 mcg/actuation inhaler INHALE 2 PUFFS EVERY 4 TO 6 HOURS AS NEEDED    famotidine (PEPCID) 20 mg tablet TAKE 1 TABLET BY MOUTH TWICE A DAY    montelukast (SINGULAIR) 10 mg tablet TAKE 1 TABLET BY MOUTH EVERY DAY    prazosin (MINIPRESS) 5 mg capsule Take 1 Capsule by mouth nightly.     prazosin (MINIPRESS) 2 mg capsule TAKE 1 CAPSULE BY MOUTH IN THE MORNING AND ONE AT 5 PM  Indications: high blood pressure, posttraumatic stress syndrome    albuterol-ipratropium (DUO-NEB) 2.5 mg-0.5 mg/3 ml nebu INHALE 1 VIAL VIA NEBULIZER EVERY 4-6 HOURS AS NEEDED    spironolactone (ALDACTONE) 25 mg tablet TAKE 1 TABLET BY MOUTH TWICE A DAY    furosemide (LASIX) 20 mg tablet TAKE 1 TO 2 TABLETS BY MOUTH EVERY MORNING AS NEEDED FOR EDEMA    FLUoxetine (PROzac) 40 mg capsule Take 1 Capsule by mouth daily.  ibuprofen (MOTRIN) 800 mg tablet TAKE 1 TABLET BY MOUTH THREE TIMES A DAY AS NEEDED    fluticasone propionate (FLONASE) 50 mcg/actuation nasal spray INSTILL 2 SPRAYS IN EACH NOSTRIL BY INTRANASAL ROUTE ONCE DAILY AS NEEDED    cloNIDine HCL (CATAPRES) 0.1 mg tablet TAKE 1 TABLET BY MOUTH TWICE A DAY    cyclobenzaprine (FLEXERIL) 5 mg tablet Take 1 Tab by mouth three (3) times daily as needed for Muscle Spasm(s). Indications: muscle spasm    oxyCODONE-acetaminophen (PERCOCET 7.5) 7.5-325 mg per tablet Take 1 Tablet by mouth three (3) times daily.  fluticasone-umeclidinium-vilanterol (Trelegy Ellipta) 100-62.5-25 mcg inhaler Take 1 Puff by inhalation daily.  topiramate (TOPAMAX) 100 mg tablet Take 100 mg by mouth two (2) times a day.  Dupixent Pen 300 mg/2 mL pnij     Shower Chair XX lawrence SHOWER BENCH NEEDED not chair. Pt has morbid obesity  With radicular back pain, arthritis and risk of falls. Uses cane for ambulation. (Patient not taking: Reported on 9/17/2021)     No current facility-administered medications for this visit. Review of Systems   Constitutional: Negative for fever. Respiratory: Negative for cough and shortness of breath. Cardiovascular: Negative for chest pain and palpitations. Musculoskeletal: Positive for back pain, joint pain, myalgias and neck pain. Reviewed PmHx, FmHx, SocHx as well as meds and allergies, updated and dated in the chart.          Objective     Visit Vitals  /82 (BP 1 Location: Left upper arm, BP Patient Position: Sitting, BP Cuff Size: Thigh)   Pulse 83   Temp 97.4 °F (36.3 °C) (Temporal)   Resp 18   Ht 5' 6\" (1.676 m)   Wt (!) 354 lb 12.8 oz (160.9 kg)   SpO2 97%   BMI 57.27 kg/m²     Physical Exam  Vitals and nursing note reviewed. Constitutional:       Appearance: Normal appearance. She is obese. HENT:      Head: Normocephalic and atraumatic. Right Ear: External ear normal.      Left Ear: External ear normal.   Cardiovascular:      Rate and Rhythm: Normal rate and regular rhythm. Heart sounds: Normal heart sounds. Pulmonary:      Effort: Pulmonary effort is normal. No respiratory distress. Breath sounds: Normal breath sounds. Musculoskeletal:      Comments: Walks with cane   Neurological:      General: No focal deficit present. Mental Status: She is alert and oriented to person, place, and time. Psychiatric:      Comments: Somewhat anxious and upset when discussing health issues. No HI/SI. Assessment and Plan     Diagnoses and all orders for this visit:    1. Morbid obesity with BMI of 50.0-59.9, adult ([facility] [address]    2. Essential hypertension: at goal. Continue to monitor. 3. Tobacco abuse: not ready to quit. She is to let me know when she is. 4. Tricompartment osteoarthritis of knees, bilateral  -     REFERRAL TO PAIN MANAGEMENT    5. Tinea pedis of both feet  -     clotrimazole (LOTRIMIN) 1 % topical cream; Apply  to affected area two (2) times a day. Medication Side Effects and Warnings were discussed with patient. Patient Labs were reviewed and or requested. Patient Past Records were reviewed and or requested. Follow-up and Dispositions    Return in about 3 months (around 1/22/2022) for chronic follow up. I have discussed the diagnosis with the patient and the intended plan as seen in the above orders. The patient has received an after-visit summary and questions were answered concerning future plans.   I have discussed medication side effects and warnings with the patient as well.       Christopher Simmons MD  [address] [place] [address]

## 2021-10-22 NOTE — PROGRESS NOTES
Visit Vitals  BP (!) 151/93 (BP 1 Location: Left upper arm, BP Patient Position: Sitting, BP Cuff Size: Large adult)   Pulse 83   Temp 97.4 °F (36.3 °C) (Temporal)   Resp 18   Ht 5' 6\" (1.676 m)   Wt (!) 354 lb 12.8 oz (160.9 kg)   SpO2 97%   BMI 57.27 kg/m²     Chief Complaint   Patient presents with    Follow-up     Smoking. Right leg, pain Doctor     1. Have you been to the ER, urgent care clinic since your last visit? Hospitalized since your last visit? No    2. Have you seen or consulted any other health care providers outside of the [address] [place] [address] since your last visit? Include any pap smears or colon screening.  No

## 2021-10-27 ENCOUNTER — OFFICE VISIT (OUTPATIENT)
Dept: CARDIOLOGY CLINIC | Age: 46
End: 2021-10-27
Payer: MEDICAID

## 2021-10-27 VITALS
DIASTOLIC BLOOD PRESSURE: 88 MMHG | HEIGHT: 66 IN | WEIGHT: 293 LBS | BODY MASS INDEX: 47.09 KG/M2 | SYSTOLIC BLOOD PRESSURE: 134 MMHG | OXYGEN SATURATION: 97 % | HEART RATE: 79 BPM

## 2021-10-27 DIAGNOSIS — R79.82 ELEVATED C-REACTIVE PROTEIN (CRP): ICD-10-CM

## 2021-10-27 DIAGNOSIS — M79.606 PAIN OF LOWER EXTREMITY, UNSPECIFIED LATERALITY: ICD-10-CM

## 2021-10-27 DIAGNOSIS — E78.5 DYSLIPIDEMIA: ICD-10-CM

## 2021-10-27 DIAGNOSIS — R53.83 FATIGUE, UNSPECIFIED TYPE: ICD-10-CM

## 2021-10-27 DIAGNOSIS — I10 ESSENTIAL HYPERTENSION: Primary | ICD-10-CM

## 2021-10-27 DIAGNOSIS — R07.9 CHEST PAIN, UNSPECIFIED TYPE: ICD-10-CM

## 2021-10-27 PROCEDURE — 99244 OFF/OP CNSLTJ NEW/EST MOD 40: CPT | Performed by: SPECIALIST

## 2021-10-27 PROCEDURE — 93000 ELECTROCARDIOGRAM COMPLETE: CPT | Performed by: SPECIALIST

## 2021-10-27 RX ORDER — GABAPENTIN 600 MG/1
TABLET ORAL 3 TIMES DAILY
COMMUNITY

## 2021-10-27 NOTE — PROGRESS NOTES
Mitchell Ramos MD. [facility] [place]              Patient: Amber Reed  : 1975      Today's Date: 10/27/2021        HISTORY OF PRESENT ILLNESS:     History of Present Illness:  Referred for CP    She has chronic SOB, palpitations (skipped beat), and chest pain (off and on). Substernal, sharp chest pain worse with walking - lasts minutes - better after she lays still for a while. Has a history of fibromyalgia. Stress test several years ago was normal.            PAST MEDICAL HISTORY:     Past Medical History:   Diagnosis Date    Arthritis     Asthma     Autoimmune disease ([facility] [address]     FIBROMYALGIA    Back pain     DDD    Chronic obstructive pulmonary disease (HCC)     Chronic pain     Depression     Fibromyalgia     GERD (gastroesophageal reflux disease)     Headache     migraines    Hypertension     Morbid obesity ([facility] [address]     Morbid obesity with body mass index (BMI) of 50.0 to 59.9 in adult [facility]) 2020    Neuropathy     Psychiatric disorder     DEPRESSION AND ANXIETY    Sleep apnea     on CPAP       Past Surgical History:   Procedure Laterality Date    HX HEENT  2004    WISDOM TEETH EXTRACTION    HX ORTHOPAEDIC Left 2014    ACHILLES TENDON \"SURGERY\"         MEDICATIONS:     Current Outpatient Medications   Medication Sig Dispense Refill    gabapentin (NEURONTIN) 600 mg tablet Take  by mouth three (3) times daily.  Dupixent Pen 300 mg/2 mL pnij       clotrimazole (LOTRIMIN) 1 % topical cream Apply  to affected area two (2) times a day. 15 g 1    butalbital-acetaminophen-caff (FIORICET) -40 mg per capsule TAKE 1 CAPSULE(S) EVERY 4- 6 HOURS BY ORAL ROUTE AS NEEDED.  40 Capsule 1    albuterol (PROVENTIL HFA, VENTOLIN HFA, PROAIR HFA) 90 mcg/actuation inhaler INHALE 2 PUFFS EVERY 4 TO 6 HOURS AS NEEDED 18 g 5    famotidine (PEPCID) 20 mg tablet TAKE 1 TABLET BY MOUTH TWICE A  Tablet 1    montelukast (SINGULAIR) 10 mg tablet TAKE 1 TABLET BY MOUTH EVERY DAY 90 Tablet 1    prazosin (MINIPRESS) 5 mg capsule Take 1 Capsule by mouth nightly. 90 Capsule 1    prazosin (MINIPRESS) 2 mg capsule TAKE 1 CAPSULE BY MOUTH IN THE MORNING AND ONE AT 5 PM  Indications: high blood pressure, posttraumatic stress syndrome 180 Capsule 1    albuterol-ipratropium (DUO-NEB) 2.5 mg-0.5 mg/3 ml nebu INHALE 1 VIAL VIA NEBULIZER EVERY 4-6 HOURS AS NEEDED 360 mL 0    spironolactone (ALDACTONE) 25 mg tablet TAKE 1 TABLET BY MOUTH TWICE A  Tablet 1    furosemide (LASIX) 20 mg tablet TAKE 1 TO 2 TABLETS BY MOUTH EVERY MORNING AS NEEDED FOR EDEMA 180 Tablet 1    FLUoxetine (PROzac) 40 mg capsule Take 1 Capsule by mouth daily. 90 Capsule 1    ibuprofen (MOTRIN) 800 mg tablet TAKE 1 TABLET BY MOUTH THREE TIMES A DAY AS NEEDED 60 Tab 2    fluticasone propionate (FLONASE) 50 mcg/actuation nasal spray INSTILL 2 SPRAYS IN EACH NOSTRIL BY INTRANASAL ROUTE ONCE DAILY AS NEEDED 1 Bottle 1    Shower Chair XX lawrence SHOWER BENCH NEEDED not chair. Pt has morbid obesity  With radicular back pain, arthritis and risk of falls. Uses cane for ambulation. 1 Each 0    cloNIDine HCL (CATAPRES) 0.1 mg tablet TAKE 1 TABLET BY MOUTH TWICE A  Tab 1    cyclobenzaprine (FLEXERIL) 5 mg tablet Take 1 Tab by mouth three (3) times daily as needed for Muscle Spasm(s). Indications: muscle spasm 90 Tab 3    oxyCODONE-acetaminophen (PERCOCET 7.5) 7.5-325 mg per tablet Take 1 Tablet by mouth three (3) times daily.  fluticasone-umeclidinium-vilanterol (Trelegy Ellipta) 100-62.5-25 mcg inhaler Take 1 Puff by inhalation daily.  topiramate (TOPAMAX) 100 mg tablet Take 100 mg by mouth two (2) times a day.          Allergies   Allergen Reactions    Cymbalta [Duloxetine] Other (comments)    Lyrica [Pregabalin] Rash    Pcn [Penicillins] Itching    Tramadol Other (comments)     Heart fluttering             SOCIAL HISTORY:     Social History     Tobacco Use    Smoking status: Current Every Day Smoker     Packs/day: 1.00    Smokeless tobacco: Never Used   Vaping Use    Vaping Use: Never used   Substance Use Topics    Alcohol use: No    Drug use: No         FAMILY HISTORY:     Family History   Problem Relation Age of Onset    Asthma Mother     Hypertension Mother     Diabetes Brother     Hypertension Brother     Cancer Father         STOMACH    Depression Sister     Hypertension Daughter     Other Daughter         OVARIAN CYSTS    Anesth Problems Neg Hx            REVIEW OF SYMPTOMS:     Review of Symptoms:  Constitutional: Negative for fever, chills  HEENT: Negative for nosebleeds, tinnitus, and vision changes. Respiratory: Negative for cough, wheezing  Cardiovascular: Negative for orthopnea, claudication, syncope, and PND. Gastrointestinal: Negative for abdominal pain, diarrhea, melena. Genitourinary: Negative for dysuria  Musculoskeletal: Negative for myalgias. Skin: Negative for rash  Heme: No problems bleeding. Neurological: Negative for speech change and focal weakness. PHYSICAL EXAM:     Physical Exam:  Visit Vitals  /88 (BP 1 Location: Left upper arm, BP Patient Position: Sitting, BP Cuff Size: Adult)   Pulse 79   Ht 5' 6\" (1.676 m)   Wt 345 lb (156.5 kg)   SpO2 97%   BMI 55.68 kg/m²     Patient appears generally well, mood and affect are appropriate and pleasant. HEENT:  Hearing intact, non-icteric, normocephalic, atraumatic. Neck Exam: Supple, No JVD or carotid bruits. Lung Exam: Clear to auscultation, even breath sounds. Cardiac Exam: Regular rate and rhythm with no murmur or rub  Abdomen: Soft, non-tender, normal bowel sounds. No bruits or masses. Extremities: Moves all ext well. No lower extremity edema. MSKTL: Overall good ROM ext  Skin: No significant rashes  Vascular: 2+ dorsalis pedis pulses bilaterally.   Psych: Appropriate affect  Neuro - Grossly intact      LABS / OTHER STUDIES reviewed:     Lab Results   Component Value Date/Time    Sodium 142 11/23/2020 03:57 PM Potassium 3.9 11/23/2020 03:57 PM    Chloride 108 (H) 11/23/2020 03:57 PM    CO2 23 11/23/2020 03:57 PM    Glucose 99 11/23/2020 03:57 PM    BUN 11 11/23/2020 03:57 PM    Creatinine 1.14 (H) 11/23/2020 03:57 PM    BUN/Creatinine ratio 10 11/23/2020 03:57 PM    GFR est AA 66 11/23/2020 03:57 PM    GFR est non-AA 57 (L) 11/23/2020 03:57 PM    Calcium 9.0 11/23/2020 03:57 PM    Bilirubin, total 0.4 11/23/2020 03:57 PM    Alk. phosphatase 133 (H) 11/23/2020 03:57 PM    Protein, total 6.8 11/23/2020 03:57 PM    Albumin 4.2 11/23/2020 03:57 PM    A-G Ratio 1.6 11/23/2020 03:57 PM    ALT (SGPT) 9 11/23/2020 03:57 PM    AST (SGOT) 13 11/23/2020 03:57 PM     Lab Results   Component Value Date/Time    WBC 5.8 11/23/2020 03:57 PM    HGB 12.7 11/23/2020 03:57 PM    HCT 37.8 11/23/2020 03:57 PM    PLATELET 462 51/60/5460 03:57 PM    MCV 78 (L) 11/23/2020 03:57 PM       Lab Results   Component Value Date/Time    Cholesterol, total 193 11/23/2020 03:57 PM    HDL Cholesterol 38 (L) 11/23/2020 03:57 PM    LDL, calculated 139 (H) 11/23/2020 03:57 PM    VLDL, calculated 16 11/23/2020 03:57 PM    Triglyceride 87 11/23/2020 03:57 PM       No results found for: TSH, TSH2, TSH3, TSHP, TSHEXT, TSHEXT        CARDIAC DIAGNOSTICS:     Cardiac Evaluation Includes:  I reviewed the results below.      EKG 10/27/21 - NSR, low voltage       ASSESSMENT AND PLAN:     Assessment and Plan:    1) Atypical CP   - pain sounds non-cardiac by description although she does have CV risk factors (smoker)   - check 2 day lexiscan cardiolite (unable to walk to due to back problems)   - check an echo     2) Leg pain chronic   - check LE PVR's (her request)    3) HTN  - BP OK - continue meds   - has been on diuretics for chronic edema     4) Smoking cessation discussed     5) Arthritis / Fibromyalgia   - CRP 47 in 2020   - has chronic diffuse pain (many joints / muscles)   - has seen Rheumatology     6) Depression     7) Trying to get disability (knee / back pain / depression)     8) Phone FU after testing. See me PRN. Charleen Mason MD, [place] [id number] [facility], [facility] [address]. [address], [facility]  Ph: [phone]   Ph [phone]      ADDENDUM   11/23/2021    Echo 11/22/21 - LVEF 68%, normal study     Lexiscan Cardiolite 11/19/1 -  Nuclear Perfusion: Equivocal myocardial perfusion. There is a small sized, low brandi partially reversible defect in the anterior wall which gets worse with prone imaging. Suspect defect is due to breast attenuation, however cannot rule out anterior ischemia. LVEF 52%    NICOLE 11/19/21 - normal rest and exercise NICOLE's    WIll call     ADDENDUM   11/24/2021  Called patient with results. Will check a coronary CTA to rule out CAD.      ADDENDUM   1/2/2022  LE YOVANY's 12/27/21 - normal study     Will send a message

## 2021-10-27 NOTE — PROGRESS NOTES
Mazin Pearce is a 48 y.o. female    Visit Vitals  /88 (BP 1 Location: Left upper arm, BP Patient Position: Sitting, BP Cuff Size: Adult)   Pulse 79   Ht 5' 6\" (1.676 m)   Wt 345 lb (156.5 kg)   SpO2 97%   BMI 55.68 kg/m²       Chief Complaint   Patient presents with    Chest Pain    Hypertension       Chest pain YES  SOB YES  Dizziness NO  Swelling YES  Recent hospital visit NO  Refills NO  COVID VACCINE STATUS NO  HAD COVID?  NO

## 2021-10-27 NOTE — PROGRESS NOTES
Orders for 2 day lexiscan cardiolite, echo, and LE PVR's when possible per Dr. Aguilar Batch VO.   Dx: cp, leg pain, edema

## 2021-10-28 ENCOUNTER — TELEPHONE (OUTPATIENT)
Dept: PRIMARY CARE CLINIC | Age: 46
End: 2021-10-28

## 2021-10-28 LAB
ALBUMIN SERPL-MCNC: 4.3 G/DL (ref 3.8–4.8)
ALBUMIN/GLOB SERPL: 1.2 {RATIO} (ref 1.2–2.2)
ALP SERPL-CCNC: 120 IU/L (ref 44–121)
ALT SERPL-CCNC: 8 IU/L (ref 0–32)
AST SERPL-CCNC: 10 IU/L (ref 0–40)
BASOPHILS # BLD AUTO: 0.1 X10E3/UL (ref 0–0.2)
BASOPHILS NFR BLD AUTO: 1 %
BILIRUB SERPL-MCNC: 0.3 MG/DL (ref 0–1.2)
BUN SERPL-MCNC: 20 MG/DL (ref 6–24)
BUN/CREAT SERPL: 18 (ref 9–23)
CALCIUM SERPL-MCNC: 9.2 MG/DL (ref 8.7–10.2)
CHLORIDE SERPL-SCNC: 104 MMOL/L (ref 96–106)
CHOLEST SERPL-MCNC: 178 MG/DL (ref 100–199)
CO2 SERPL-SCNC: 21 MMOL/L (ref 20–29)
CREAT SERPL-MCNC: 1.13 MG/DL (ref 0.57–1)
CRP SERPL HS-MCNC: 14.63 MG/L (ref 0–3)
EOSINOPHIL # BLD AUTO: 0 X10E3/UL (ref 0–0.4)
EOSINOPHIL NFR BLD AUTO: 0 %
ERYTHROCYTE [DISTWIDTH] IN BLOOD BY AUTOMATED COUNT: 16 % (ref 11.7–15.4)
GLOBULIN SER CALC-MCNC: 3.6 G/DL (ref 1.5–4.5)
GLUCOSE SERPL-MCNC: 97 MG/DL (ref 65–99)
HCT VFR BLD AUTO: 41.9 % (ref 34–46.6)
HDLC SERPL-MCNC: 52 MG/DL
HGB BLD-MCNC: 13.9 G/DL (ref 11.1–15.9)
IMM GRANULOCYTES # BLD AUTO: 0.1 X10E3/UL (ref 0–0.1)
IMM GRANULOCYTES NFR BLD AUTO: 1 %
LDLC SERPL CALC-MCNC: 112 MG/DL (ref 0–99)
LYMPHOCYTES # BLD AUTO: 2 X10E3/UL (ref 0.7–3.1)
LYMPHOCYTES NFR BLD AUTO: 22 %
MCH RBC QN AUTO: 26.2 PG (ref 26.6–33)
MCHC RBC AUTO-ENTMCNC: 33.2 G/DL (ref 31.5–35.7)
MCV RBC AUTO: 79 FL (ref 79–97)
MONOCYTES # BLD AUTO: 0.3 X10E3/UL (ref 0.1–0.9)
MONOCYTES NFR BLD AUTO: 4 %
NEUTROPHILS # BLD AUTO: 6.8 X10E3/UL (ref 1.4–7)
NEUTROPHILS NFR BLD AUTO: 72 %
PLATELET # BLD AUTO: 291 X10E3/UL (ref 150–450)
POTASSIUM SERPL-SCNC: 4.5 MMOL/L (ref 3.5–5.2)
PROT SERPL-MCNC: 7.9 G/DL (ref 6–8.5)
RBC # BLD AUTO: 5.31 X10E6/UL (ref 3.77–5.28)
SODIUM SERPL-SCNC: 141 MMOL/L (ref 134–144)
TRIGL SERPL-MCNC: 75 MG/DL (ref 0–149)
TSH SERPL DL<=0.005 MIU/L-ACNC: 1.14 UIU/ML (ref 0.45–4.5)
VLDLC SERPL CALC-MCNC: 14 MG/DL (ref 5–40)
WBC # BLD AUTO: 9.3 X10E3/UL (ref 3.4–10.8)

## 2021-10-28 NOTE — TELEPHONE ENCOUNTER
Pain management that she was to take was for injections only and it was canceled,  She needs to know what next steps to take.   Call her at [phone]

## 2021-10-28 NOTE — PROGRESS NOTES
Pls call patient. Mild kidney impairment, but overall function is okay. Other labs within acceptable limits.

## 2021-10-29 ENCOUNTER — TELEPHONE (OUTPATIENT)
Dept: PRIMARY CARE CLINIC | Age: 46
End: 2021-10-29

## 2021-11-04 ENCOUNTER — TELEPHONE (OUTPATIENT)
Dept: CARDIOLOGY CLINIC | Age: 46
End: 2021-11-04

## 2021-11-04 NOTE — TELEPHONE ENCOUNTER
Informed that testing was not properly scheduled; r/s nuc for 2nd day to be on 11/22 to allow for echo. Called pt, LVM.   Will mail updated testing schedule to pt.    11/19:  1:00 p LE PVR's  2:00 p Stress Nuc day    11/22:  2:00 p Resting Nuc day  3:00 p Echo

## 2021-11-08 DIAGNOSIS — G44.229 CHRONIC TENSION-TYPE HEADACHE, NOT INTRACTABLE: ICD-10-CM

## 2021-11-08 NOTE — TELEPHONE ENCOUNTER
Patient would like a call back in regards to the appointment she had with pain management today, and that physicians recommendations. [phone].

## 2021-11-11 RX ORDER — BUTALBITAL, ACETAMINOPHEN AND CAFFEINE 300; 40; 50 MG/1; MG/1; MG/1
CAPSULE ORAL
Qty: 40 CAPSULE | Refills: 1 | Status: SHIPPED | OUTPATIENT
Start: 2021-11-11 | End: 2022-01-07 | Stop reason: SDUPTHER

## 2021-11-11 NOTE — TELEPHONE ENCOUNTER
Spoke with patient regarding pain management doctor. Patient was not happy with recommendations from the doctor. Patient is going to call insurance company to see if there is anyone else she can see. If not will follow up with Dr. Linus Guadarrama.

## 2021-11-19 ENCOUNTER — ANCILLARY PROCEDURE (OUTPATIENT)
Dept: CARDIOLOGY CLINIC | Age: 46
End: 2021-11-19

## 2021-11-19 ENCOUNTER — ANCILLARY PROCEDURE (OUTPATIENT)
Dept: CARDIOLOGY CLINIC | Age: 46
End: 2021-11-19
Payer: MEDICAID

## 2021-11-19 VITALS — BODY MASS INDEX: 47.09 KG/M2 | HEIGHT: 66 IN | WEIGHT: 293 LBS

## 2021-11-19 VITALS — HEIGHT: 66 IN | BODY MASS INDEX: 47.09 KG/M2 | WEIGHT: 293 LBS

## 2021-11-19 DIAGNOSIS — R20.0 LEG NUMBNESS: ICD-10-CM

## 2021-11-19 DIAGNOSIS — I10 ESSENTIAL HYPERTENSION: ICD-10-CM

## 2021-11-19 DIAGNOSIS — R53.83 FATIGUE, UNSPECIFIED TYPE: ICD-10-CM

## 2021-11-19 DIAGNOSIS — M79.605 PAIN IN BOTH LOWER EXTREMITIES: ICD-10-CM

## 2021-11-19 DIAGNOSIS — R07.9 CHEST PAIN, UNSPECIFIED TYPE: ICD-10-CM

## 2021-11-19 DIAGNOSIS — M79.604 PAIN IN BOTH LOWER EXTREMITIES: ICD-10-CM

## 2021-11-19 LAB
IMMEDIATE ARM BP: 129 MMHG
IMMEDIATE LEFT ABI: 1.22
IMMEDIATE LEFT TIBIAL: 157 MMHG
IMMEDIATE RIGHT ABI: 1.16
IMMEDIATE RIGHT TIBIAL: 150 MMHG
LEFT ABI: 1.04
LEFT ARM BP: 133 MMHG
LEFT POSTERIOR TIBIAL: 138 MMHG
RIGHT ABI: 1.11
RIGHT ARM BP: 132 MMHG
RIGHT POSTERIOR TIBIAL: 148 MMHG
VAS LEFT DORSALIS PEDIS BP: 128 MMHG
VAS RIGHT DORSALIS PEDIS BP: 138 MMHG

## 2021-11-19 PROCEDURE — 93922 UPR/L XTREMITY ART 2 LEVELS: CPT | Performed by: SPECIALIST

## 2021-11-19 PROCEDURE — A9500 TC99M SESTAMIBI: HCPCS

## 2021-11-19 PROCEDURE — 93015 CV STRESS TEST SUPVJ I&R: CPT | Performed by: SPECIALIST

## 2021-11-19 PROCEDURE — 78452 HT MUSCLE IMAGE SPECT MULT: CPT | Performed by: SPECIALIST

## 2021-11-19 RX ORDER — TETRAKIS(2-METHOXYISOBUTYLISOCYANIDE)COPPER(I) TETRAFLUOROBORATE 1 MG/ML
25.5 INJECTION, POWDER, LYOPHILIZED, FOR SOLUTION INTRAVENOUS ONCE
Status: COMPLETED | OUTPATIENT
Start: 2021-11-19 | End: 2021-11-19

## 2021-11-19 RX ADMIN — TETRAKIS(2-METHOXYISOBUTYLISOCYANIDE)COPPER(I) TETRAFLUOROBORATE 25.5 MILLICURIE: 1 INJECTION, POWDER, LYOPHILIZED, FOR SOLUTION INTRAVENOUS at 14:10

## 2021-11-22 ENCOUNTER — ANCILLARY PROCEDURE (OUTPATIENT)
Dept: CARDIOLOGY CLINIC | Age: 46
End: 2021-11-22
Payer: MEDICAID

## 2021-11-22 ENCOUNTER — TELEPHONE (OUTPATIENT)
Dept: CARDIOLOGY CLINIC | Age: 46
End: 2021-11-22

## 2021-11-22 VITALS
SYSTOLIC BLOOD PRESSURE: 130 MMHG | DIASTOLIC BLOOD PRESSURE: 80 MMHG | WEIGHT: 293 LBS | HEIGHT: 66 IN | BODY MASS INDEX: 47.09 KG/M2

## 2021-11-22 DIAGNOSIS — R53.83 FATIGUE, UNSPECIFIED TYPE: ICD-10-CM

## 2021-11-22 DIAGNOSIS — I10 ESSENTIAL HYPERTENSION: ICD-10-CM

## 2021-11-22 DIAGNOSIS — R07.9 CHEST PAIN, UNSPECIFIED TYPE: Primary | ICD-10-CM

## 2021-11-22 DIAGNOSIS — R07.9 CHEST PAIN, UNSPECIFIED TYPE: ICD-10-CM

## 2021-11-22 LAB
ECHO AO ASC DIAM: 3.08 CM
ECHO AO ROOT DIAM: 2.86 CM
ECHO AV AREA PEAK VELOCITY: 3.04 CM2
ECHO AV AREA VTI: 3.08 CM2
ECHO AV AREA/BSA PEAK VELOCITY: 1.2 CM2/M2
ECHO AV AREA/BSA VTI: 1.2 CM2/M2
ECHO AV MEAN GRADIENT: 5.39 MMHG
ECHO AV PEAK GRADIENT: 10.06 MMHG
ECHO AV PEAK VELOCITY: 158.58 CM/S
ECHO AV VTI: 26.87 CM
ECHO EST RA PRESSURE: 3 MMHG
ECHO IVC PROX: 1.92 CM
ECHO LA AREA 4C: 17.53 CM2
ECHO LA MAJOR AXIS: 4.15 CM
ECHO LA MINOR AXIS: 1.65 CM
ECHO LA VOL 2C: 43.31 ML (ref 22–52)
ECHO LA VOL 4C: 48.5 ML (ref 22–52)
ECHO LA VOL BP: 51.03 ML (ref 22–52)
ECHO LA VOL/BSA BIPLANE: 20.25 ML/M2 (ref 16–28)
ECHO LA VOLUME INDEX A2C: 17.19 ML/M2 (ref 16–28)
ECHO LA VOLUME INDEX A4C: 19.25 ML/M2 (ref 16–28)
ECHO LV E' LATERAL VELOCITY: 8.81 CM/S
ECHO LV EDV A2C: 97.95 ML
ECHO LV EDV A4C: 129.36 ML
ECHO LV EDV BP: 114.83 ML (ref 56–104)
ECHO LV EDV INDEX A4C: 51.3 ML/M2
ECHO LV EDV INDEX BP: 45.6 ML/M2
ECHO LV EDV NDEX A2C: 38.9 ML/M2
ECHO LV EJECTION FRACTION A2C: 64 PERCENT
ECHO LV EJECTION FRACTION A4C: 72 PERCENT
ECHO LV EJECTION FRACTION BIPLANE: 68.5 PERCENT (ref 55–100)
ECHO LV ESV A2C: 35.61 ML
ECHO LV ESV A4C: 35.84 ML
ECHO LV ESV BP: 36.2 ML (ref 19–49)
ECHO LV ESV INDEX A2C: 14.1 ML/M2
ECHO LV ESV INDEX A4C: 14.2 ML/M2
ECHO LV ESV INDEX BP: 14.4 ML/M2
ECHO LV INTERNAL DIMENSION DIASTOLIC: 4.54 CM (ref 3.9–5.3)
ECHO LV INTERNAL DIMENSION SYSTOLIC: 3.11 CM
ECHO LV IVSD: 0.88 CM (ref 0.6–0.9)
ECHO LV MASS 2D: 126.8 G (ref 67–162)
ECHO LV MASS INDEX 2D: 50.3 G/M2 (ref 43–95)
ECHO LV POSTERIOR WALL DIASTOLIC: 0.84 CM (ref 0.6–0.9)
ECHO LVOT DIAM: 2.31 CM
ECHO LVOT PEAK GRADIENT: 5.28 MMHG
ECHO LVOT PEAK VELOCITY: 114.92 CM/S
ECHO LVOT SV: 82.8 ML
ECHO LVOT VTI: 19.75 CM
ECHO MV MAX VELOCITY: 75.34 CM/S
ECHO MV MEAN GRADIENT: 1.21 MMHG
ECHO MV PEAK GRADIENT: 2.27 MMHG
ECHO MV VTI: 17.58 CM
ECHO RA AREA 4C: 18.93 CM2
ECHO RIGHT VENTRICULAR SYSTOLIC PRESSURE (RVSP): 19.43 MMHG
ECHO RV INTERNAL DIMENSION: 3.8 CM
ECHO RV TAPSE: 2.21 CM (ref 1.5–2)
ECHO TV REGURGITANT MAX VELOCITY: 202.7 CM/S
ECHO TV REGURGITANT PEAK GRADIENT: 16.43 MMHG

## 2021-11-22 PROCEDURE — 93306 TTE W/DOPPLER COMPLETE: CPT | Performed by: SPECIALIST

## 2021-11-22 NOTE — TELEPHONE ENCOUNTER
Per Dr. Cristina Schroeder: Fernando Peeks you please give patient normal NICOLE results.  Thanks. \"

## 2021-11-23 ENCOUNTER — APPOINTMENT (OUTPATIENT)
Dept: CARDIOLOGY CLINIC | Age: 46
End: 2021-11-23

## 2021-11-23 LAB
STRESS BASELINE DIAS BP: 86 MMHG
STRESS BASELINE HR: 69 BPM
STRESS BASELINE SYS BP: 136 MMHG
STRESS ESTIMATED WORKLOAD: 1 METS
STRESS EXERCISE DUR MIN: NORMAL
STRESS O2 SAT PEAK: 98 %
STRESS O2 SAT REST: 95 %
STRESS PEAK DIAS BP: 78 MMHG
STRESS PEAK SYS BP: 132 MMHG
STRESS PERCENT HR ACHIEVED: 65 %
STRESS POST PEAK HR: 112 BPM
STRESS RATE PRESSURE PRODUCT: NORMAL BPM*MMHG
STRESS ST DEPRESSION: 0 MM
STRESS ST ELEVATION: 0 MM
STRESS TARGET HR: 172 BPM

## 2021-11-23 RX ORDER — TETRAKIS(2-METHOXYISOBUTYLISOCYANIDE)COPPER(I) TETRAFLUOROBORATE 1 MG/ML
40 INJECTION, POWDER, LYOPHILIZED, FOR SOLUTION INTRAVENOUS ONCE
Status: COMPLETED | OUTPATIENT
Start: 2021-11-23 | End: 2021-11-23

## 2021-11-23 RX ADMIN — TETRAKIS(2-METHOXYISOBUTYLISOCYANIDE)COPPER(I) TETRAFLUOROBORATE 24.6 MILLICURIE: 1 INJECTION, POWDER, LYOPHILIZED, FOR SOLUTION INTRAVENOUS at 12:45

## 2021-11-26 RX ORDER — ATENOLOL 25 MG/1
TABLET ORAL
Qty: 3 TABLET | Refills: 0 | Status: SHIPPED | OUTPATIENT
Start: 2021-11-26

## 2021-11-26 NOTE — TELEPHONE ENCOUNTER
Called pt,  Per Dr. Jose Antonio Conte: Noreen Gottron you please set her up for a coronary CTA for equivocal stress test results. \"  Confirmed pharmacy.

## 2021-12-13 ENCOUNTER — TELEPHONE (OUTPATIENT)
Dept: CARDIOLOGY CLINIC | Age: 46
End: 2021-12-13

## 2021-12-13 NOTE — TELEPHONE ENCOUNTER
Patient states she has not received a call back regarding scheduling her MRI(per patient, she was unsure if its actually an MRI)       Phone: [phone]

## 2021-12-21 DIAGNOSIS — J45.40 MODERATE PERSISTENT ASTHMA, UNCOMPLICATED: ICD-10-CM

## 2021-12-21 RX ORDER — IPRATROPIUM BROMIDE AND ALBUTEROL SULFATE 2.5; .5 MG/3ML; MG/3ML
SOLUTION RESPIRATORY (INHALATION)
Qty: 360 ML | Refills: 1 | Status: SHIPPED | OUTPATIENT
Start: 2021-12-21 | End: 2022-02-17

## 2021-12-27 ENCOUNTER — HOSPITAL ENCOUNTER (OUTPATIENT)
Dept: VASCULAR SURGERY | Age: 46
Discharge: HOME | End: 2021-12-27
Attending: SPECIALIST
Payer: MEDICAID

## 2021-12-27 DIAGNOSIS — M79.606 PAIN OF LOWER EXTREMITY, UNSPECIFIED LATERALITY: ICD-10-CM

## 2021-12-27 PROCEDURE — 93923 UPR/LXTR ART STDY 3+ LVLS: CPT

## 2021-12-29 LAB
LEFT ABI: 0.92
LEFT ARM BP: 146 MMHG
LEFT CALF PRESSURE: 143 MMHG
LEFT POSTERIOR TIBIAL: 135 MMHG
LEFT TBI: 0.67
LEFT TOE PRESSURE: 98 MMHG
RIGHT ABI: 0.94
RIGHT ARM BP: 144 MMHG
RIGHT CALF PRESSURE: 169 MMHG
RIGHT POSTERIOR TIBIAL: 137 MMHG
RIGHT TBI: 0.64
RIGHT TOE PRESSURE: 93 MMHG
VAS LEFT DORSALIS PEDIS BP: 130 MMHG
VAS RIGHT DORSALIS PEDIS BP: 135 MMHG

## 2022-01-03 ENCOUNTER — TELEPHONE (OUTPATIENT)
Dept: CARDIOLOGY CLINIC | Age: 47
End: 2022-01-03

## 2022-01-07 DIAGNOSIS — G44.229 CHRONIC TENSION-TYPE HEADACHE, NOT INTRACTABLE: ICD-10-CM

## 2022-01-10 ENCOUNTER — HOSPITAL ENCOUNTER (OUTPATIENT)
Dept: MRI IMAGING | Age: 47
Discharge: HOME | End: 2022-01-10
Attending: PHYSICAL MEDICINE & REHABILITATION
Payer: MEDICAID

## 2022-01-10 DIAGNOSIS — M54.10 RADICULOPATHY, SITE UNSPECIFIED: ICD-10-CM

## 2022-01-10 DIAGNOSIS — M54.50 LOW BACK PAIN: ICD-10-CM

## 2022-01-10 DIAGNOSIS — Z79.891 LONG-TERM CURRENT USE OF OPIATE ANALGESIC: ICD-10-CM

## 2022-01-10 PROCEDURE — 72148 MRI LUMBAR SPINE W/O DYE: CPT

## 2022-01-11 NOTE — TELEPHONE ENCOUNTER
Conjuntivae and eyelids appear normal , Sclerae : White without injection Patient needs refill of Fiorcet

## 2022-01-12 RX ORDER — BUTALBITAL, ACETAMINOPHEN AND CAFFEINE 300; 40; 50 MG/1; MG/1; MG/1
CAPSULE ORAL
Qty: 40 CAPSULE | Refills: 1 | Status: SHIPPED | OUTPATIENT
Start: 2022-01-12 | End: 2022-02-08 | Stop reason: SDUPTHER

## 2022-01-27 ENCOUNTER — OFFICE VISIT (OUTPATIENT)
Dept: PRIMARY CARE CLINIC | Age: 47
End: 2022-01-27
Payer: MEDICAID

## 2022-01-27 VITALS
SYSTOLIC BLOOD PRESSURE: 138 MMHG | BODY MASS INDEX: 47.09 KG/M2 | HEIGHT: 66 IN | RESPIRATION RATE: 18 BRPM | WEIGHT: 293 LBS | DIASTOLIC BLOOD PRESSURE: 82 MMHG | TEMPERATURE: 97.6 F | HEART RATE: 92 BPM | OXYGEN SATURATION: 98 %

## 2022-01-27 DIAGNOSIS — J30.1 SEASONAL ALLERGIC RHINITIS DUE TO POLLEN: ICD-10-CM

## 2022-01-27 DIAGNOSIS — M17.0 PRIMARY OSTEOARTHRITIS OF BOTH KNEES: ICD-10-CM

## 2022-01-27 DIAGNOSIS — D21.9 FIBROIDS: ICD-10-CM

## 2022-01-27 DIAGNOSIS — I10 ESSENTIAL HYPERTENSION: Primary | Chronic | ICD-10-CM

## 2022-01-27 DIAGNOSIS — M51.36 DEGENERATION OF LUMBAR INTERVERTEBRAL DISC: Chronic | ICD-10-CM

## 2022-01-27 DIAGNOSIS — E66.01 MORBID OBESITY WITH BODY MASS INDEX (BMI) OF 50.0 TO 59.9 IN ADULT (HCC): ICD-10-CM

## 2022-01-27 DIAGNOSIS — Z72.0 TOBACCO ABUSE: ICD-10-CM

## 2022-01-27 PROCEDURE — 99214 OFFICE O/P EST MOD 30 MIN: CPT | Performed by: FAMILY MEDICINE

## 2022-01-27 RX ORDER — SPIRONOLACTONE 25 MG/1
TABLET ORAL
Qty: 180 TABLET | Refills: 1 | Status: SHIPPED | OUTPATIENT
Start: 2022-01-27 | End: 2022-10-06

## 2022-01-27 RX ORDER — CLONIDINE HYDROCHLORIDE 0.1 MG/1
TABLET ORAL
Qty: 180 TABLET | Refills: 1 | Status: SHIPPED | OUTPATIENT
Start: 2022-01-27 | End: 2022-06-01

## 2022-01-27 RX ORDER — IBUPROFEN 200 MG
1 TABLET ORAL EVERY 24 HOURS
Qty: 30 PATCH | Refills: 0 | Status: SHIPPED | OUTPATIENT
Start: 2022-01-27 | End: 2022-02-17

## 2022-01-27 RX ORDER — OXYCODONE AND ACETAMINOPHEN 10; 325 MG/1; MG/1
1 TABLET ORAL
COMMUNITY

## 2022-01-27 RX ORDER — FLUTICASONE PROPIONATE 50 MCG
SPRAY, SUSPENSION (ML) NASAL
Qty: 3 EACH | Refills: 1 | Status: SHIPPED | OUTPATIENT
Start: 2022-01-27 | End: 2022-10-06

## 2022-01-27 NOTE — PROGRESS NOTES
Visit Vitals  /82 (BP 1 Location: Left upper arm, BP Patient Position: Sitting, BP Cuff Size: Large adult)   Pulse 92   Temp 97.6 °F (36.4 °C) (Temporal)   Resp 18   Ht 5' 6\" (1.676 m)   Wt (!) 359 lb 3.2 oz (162.9 kg)   SpO2 98%   BMI 57.98 kg/m²     Chief Complaint   Patient presents with    Follow-up     1. Have you been to the ER, urgent care clinic since your last visit? Hospitalized since your last visit? No    2. Have you seen or consulted any other health care providers outside of the [address] [place] [address] since your last visit? Include any pap smears or colon screening.  No

## 2022-01-27 NOTE — PROGRESS NOTES
HPI     Chief Complaint   Patient presents with    Follow-up        HPI:  Malon [facility] is a 48 y.o. female who is here for follow up. Smoking Cessation: Is smoking 1/3-1/2 ppd currently. Was given patches, tried, but did not fully commit to them. Would like to try again. Chronic back pain: Bilateral foot pain and knee pain. Ambulates with pain. Has a new pain management (Ms. Ward) and orthopedic doctor. Xray in 2019 showed tricompartmental deterioration in knees. MRI of back recently showed multilevel disc degeneration, facet arthropathy, canal and neuroforaminal stenosis. Her oxycodone has been increased to 10mg. Morbid Obesity: Patient knows she needs to lose weight, but msk complaints hinder ability to exercise. Discouraged by providers focusing on just weight issues. Bilateral Tricompartmental Knee Arthritis: Is pleased with Ms Serranogiana Archie and current pain management office. Previously felt pain management only focused on weight management and negated her concerns. She does not want to do surgery. HTN: at goal. Taking medications. No chest pain or palpitations. Fibroids: incidentally seen on MRI. Does not get regular menses, have been sporadic for a few years now, goes several months without one. Flow is normal. No abdominal pain. Is following up with OB/Gyn who is doing ultrasound Feb 3rd. Seasonal Allergies: controlled on flonase. Allergies   Allergen Reactions    Cymbalta [Duloxetine] Other (comments)    Lyrica [Pregabalin] Rash    Pcn [Penicillins] Itching    Tramadol Other (comments)     Heart fluttering       Current Outpatient Medications   Medication Sig    oxyCODONE-acetaminophen (PERCOCET 10)  mg per tablet Take 1 Tablet by mouth every three (3) hours.  nicotine (NICODERM CQ) 21 mg/24 hr 1 Patch by TransDERmal route every twenty-four (24) hours for 30 days.  Take off at night    cloNIDine HCL (CATAPRES) 0.1 mg tablet TAKE 1 TABLET BY MOUTH TWICE A DAY    fluticasone propionate (FLONASE) 50 mcg/actuation nasal spray INSTILL 2 SPRAYS IN EACH NOSTRIL BY INTRANASAL ROUTE ONCE DAILY AS NEEDED    spironolactone (ALDACTONE) 25 mg tablet TAKE 1 TABLET BY MOUTH TWICE A DAY    butalbital-acetaminophen-caff (FIORICET) -40 mg per capsule TAKE 1 CAPSULE(S) EVERY 4- 6 HOURS BY ORAL ROUTE AS NEEDED.  albuterol-ipratropium (DUO-NEB) 2.5 mg-0.5 mg/3 ml nebu INHALE 1 VIAL VIA NEBULIZER EVERY 4-6 HOURS AS NEEDED    gabapentin (NEURONTIN) 600 mg tablet Take  by mouth three (3) times daily.  albuterol (PROVENTIL HFA, VENTOLIN HFA, PROAIR HFA) 90 mcg/actuation inhaler INHALE 2 PUFFS EVERY 4 TO 6 HOURS AS NEEDED    famotidine (PEPCID) 20 mg tablet TAKE 1 TABLET BY MOUTH TWICE A DAY    montelukast (SINGULAIR) 10 mg tablet TAKE 1 TABLET BY MOUTH EVERY DAY    prazosin (MINIPRESS) 5 mg capsule Take 1 Capsule by mouth nightly.  prazosin (MINIPRESS) 2 mg capsule TAKE 1 CAPSULE BY MOUTH IN THE MORNING AND ONE AT 5 PM  Indications: high blood pressure, posttraumatic stress syndrome    furosemide (LASIX) 20 mg tablet TAKE 1 TO 2 TABLETS BY MOUTH EVERY MORNING AS NEEDED FOR EDEMA    FLUoxetine (PROzac) 40 mg capsule Take 1 Capsule by mouth daily.  ibuprofen (MOTRIN) 800 mg tablet TAKE 1 TABLET BY MOUTH THREE TIMES A DAY AS NEEDED    cyclobenzaprine (FLEXERIL) 5 mg tablet Take 1 Tab by mouth three (3) times daily as needed for Muscle Spasm(s). Indications: muscle spasm    fluticasone-umeclidinium-vilanterol (Trelegy Ellipta) 100-62.5-25 mcg inhaler Take 1 Puff by inhalation daily.  topiramate (TOPAMAX) 100 mg tablet Take 100 mg by mouth two (2) times a day.  atenoloL (TENORMIN) 25 mg tablet Take 2 tablets the night before procedure; then one tablet 1 hour before procedure (for coronary CT).  (Patient not taking: Reported on 1/27/2022)    Dupixent Pen 300 mg/2 mL pnij  (Patient not taking: Reported on 1/27/2022)    Shower Chair XX lawrence SHOWER BENCH NEEDED not chair. Pt has morbid obesity  With radicular back pain, arthritis and risk of falls. Uses cane for ambulation. (Patient not taking: Reported on 1/27/2022)    oxyCODONE-acetaminophen (PERCOCET 7.5) 7.5-325 mg per tablet Take 1 Tablet by mouth three (3) times daily. (Patient not taking: Reported on 1/27/2022)     No current facility-administered medications for this visit. Review of Systems   Constitutional: Negative for fever. Respiratory: Negative for cough and shortness of breath. Cardiovascular: Negative for chest pain and palpitations. Gastrointestinal: Negative for abdominal pain and blood in stool. Musculoskeletal: Positive for back pain, joint pain, myalgias and neck pain. Reviewed PmHx, FmHx, SocHx as well as meds and allergies, updated and dated in the chart. Objective     Visit Vitals  /82 (BP 1 Location: Left upper arm, BP Patient Position: Sitting, BP Cuff Size: Large adult)   Pulse 92   Temp 97.6 °F (36.4 °C) (Temporal)   Resp 18   Ht 5' 6\" (1.676 m)   Wt (!) 359 lb 3.2 oz (162.9 kg)   SpO2 98%   BMI 57.98 kg/m²     Physical Exam  Vitals and nursing note reviewed. Constitutional:       Appearance: Normal appearance. She is obese. HENT:      Head: Normocephalic and atraumatic. Cardiovascular:      Rate and Rhythm: Normal rate and regular rhythm. Heart sounds: Normal heart sounds. Pulmonary:      Effort: Pulmonary effort is normal. No respiratory distress. Breath sounds: Normal breath sounds. Musculoskeletal:      Comments: Walks with cane. Neurological:      General: No focal deficit present. Mental Status: She is alert and oriented to person, place, and time. Psychiatric:      Comments: Somewhat anxious and upset when discussing health issues. No HI/SI. Assessment and Plan     Diagnoses and all orders for this visit:    1. Essential hypertension  Comments: At goal. Continue current regimen.   Orders:  -     cloNIDine HCL (CATAPRES) 0.1 mg tablet; TAKE 1 TABLET BY MOUTH TWICE A DAY  -     spironolactone (ALDACTONE) 25 mg tablet; TAKE 1 TABLET BY MOUTH TWICE A DAY    2. Degeneration of lumbar intervertebral disc  Comments: Followed by Pain management, medications from them. 3. Primary osteoarthritis of both knees  Comments:  Continue care with pain management and ortho. 4. Morbid obesity with body mass index (BMI) of 50.0 to 59.9 in adult [facility])  Comments:  Encourage lifestyle changes. 5. Tobacco abuse  Comments: Will try patches again, 21mcg, remove at night. Orders:  -     nicotine (NICODERM CQ) 21 mg/24 hr; 1 Patch by TransDERmal route every twenty-four (24) hours for 30 days. Take off at night    6. Seasonal allergic rhinitis due to pollen  Comments:  Controlled. Orders:  -     fluticasone propionate (FLONASE) 50 mcg/actuation nasal spray; INSTILL 2 SPRAYS IN EACH NOSTRIL BY INTRANASAL ROUTE ONCE DAILY AS NEEDED    7. Fibroids  Comments:  Has follow up with Gyn withultrasound. Medication Side Effects and Warnings were discussed with patient. Patient Labs were reviewed and or requested. Patient Past Records were reviewed and or requested. Follow-up and Dispositions    Return in about 6 months (around 7/27/2022) for chronic follow up, annual physical.          I have discussed the diagnosis with the patient and the intended plan as seen in the above orders. The patient has received an after-visit summary and questions were answered concerning future plans. I have discussed medication side effects and warnings with the patient as well.       Ashutosh Isaac MD  [address] [place] [address]

## 2022-02-08 ENCOUNTER — VIRTUAL VISIT (OUTPATIENT)
Dept: PRIMARY CARE CLINIC | Age: 47
End: 2022-02-08
Payer: MEDICAID

## 2022-02-08 DIAGNOSIS — E66.01 MORBID OBESITY WITH BODY MASS INDEX (BMI) OF 50.0 TO 59.9 IN ADULT (HCC): Primary | ICD-10-CM

## 2022-02-08 DIAGNOSIS — M17.0 PRIMARY OSTEOARTHRITIS OF BOTH KNEES: ICD-10-CM

## 2022-02-08 DIAGNOSIS — Z99.89 USE OF CANE AS AMBULATORY AID: ICD-10-CM

## 2022-02-08 DIAGNOSIS — G44.229 CHRONIC TENSION-TYPE HEADACHE, NOT INTRACTABLE: ICD-10-CM

## 2022-02-08 DIAGNOSIS — M25.50 MULTIPLE JOINT PAIN: ICD-10-CM

## 2022-02-08 PROCEDURE — 99214 OFFICE O/P EST MOD 30 MIN: CPT | Performed by: FAMILY MEDICINE

## 2022-02-08 RX ORDER — NALOXONE HYDROCHLORIDE 4 MG/.1ML
SPRAY NASAL
Qty: 1 EACH | Refills: 0 | Status: SHIPPED | OUTPATIENT
Start: 2022-02-08

## 2022-02-08 RX ORDER — BUTALBITAL, ACETAMINOPHEN AND CAFFEINE 300; 40; 50 MG/1; MG/1; MG/1
CAPSULE ORAL
Qty: 40 CAPSULE | Refills: 1 | Status: SHIPPED | OUTPATIENT
Start: 2022-02-08 | End: 2022-05-17 | Stop reason: SDUPTHER

## 2022-02-08 NOTE — PROGRESS NOTES
HPI     Chief Complaint   Patient presents with    Follow-up     Questions regarding health issues. HPI:  Odilon Metz is a 48 y.o. female who has questions about disability paperwork. Patient has chronic back pain and osteoarthritis and is following with MACO Ward, with pain management who is arranging her pain medication prescriptions for which they recently increased her to oxycodone 10mg. She's had recent MRIs, noted to show multilevel disc degeneration, facet arthropathy, canal and neuroforaminal stenosis. I have recommended her to see Ortho, but she stopped going. She felt they encouraged weight loss more than focusing on her pain and there was confusion about her visit, which was schedule for knee pain but they wanted to discuss shoulder issues, which she already had surgical intervention on. Ms. Ethel Christiansen lives by herself, she does drive herself, she ambulates with a cane. Migraines: she also wants a refill of fiorcet. She says imitrex did not help. Nor did topamax. She says she does not get groggy when she takes the fiorcet. She takes medication for migraines several times a week, headache pattern is not changing. Review of Systems   Cardiovascular: Negative for chest pain and palpitations. Musculoskeletal: Positive for back pain and myalgias. Psychiatric/Behavioral: Negative for hallucinations and substance abuse. Reviewed PmHx, FmHx, SocHx as well as meds and allergies, updated and dated in the chart. Objective     Vital Signs: (As obtained by patient/caregiver at home)  There were no vitals taken for this visit.    Patient-Reported Vitals 2/3/2021   Patient-Reported Weight 338       [INSTRUCTIONS:  \"[x]\" Indicates a positive item  \"[]\" Indicates a negative item  -- DELETE ALL ITEMS NOT EXAMINED]    Constitutional: [x] Appears well-developed and well-nourished [x] No apparent distress      [] Abnormal -     Mental status: [x] Alert and awake  [x] Oriented to person/place/time [x] Able to follow commands    [] Abnormal -     Eyes:   EOM    [x]  Normal    [] Abnormal -   Sclera  [x]  Normal    [] Abnormal -          Discharge [x]  None visible   [] Abnormal -     HENT: [x] Normocephalic, atraumatic  [] Abnormal -   [x] Mouth/Throat: Mucous membranes are moist    External Ears [x] Normal  [] Abnormal -    Neck: [x] No visualized mass [] Abnormal -     Pulmonary/Chest: [x] Respiratory effort normal   [x] No visualized signs of difficulty breathing or respiratory distress        [] Abnormal -      Musculoskeletal:   [] Normal gait with no signs of ataxia         [] Normal range of motion of neck        [] Abnormal -     Neurological:        [x] No Facial Asymmetry (Cranial nerve 7 motor function) (limited exam due to video visit)          [x] No gaze palsy        [] Abnormal -          Skin:        [x] No significant exanthematous lesions or discoloration noted on facial skin         [] Abnormal -            Psychiatric:       [x] Normal Affect [] Abnormal -        [x] No Hallucinations    Other pertinent observable physical exam findings:-     On this date 02/08/2022 I have spent 15 minutes reviewing previous notes, test results and face to face (virtual) with the patient discussing the diagnosis and importance of compliance with the treatment plan as well as documenting on the day of the visit. Assessment and Plan     Discussed disability paperwork should be completed by the provider managing the disability. She is seeking disability due to chronic back pain which is followed/managed by pain mgmt. The chronic conditions I am managing do not preclude disability. Patient will follow up with Pain specialist/Ortho. I also discussed needing to alter medicines. She is on oxycodone, gabapentin, fiorcet. I advise avoiding other sedating medications. Narcan prescription sent to pharmacy.     Last PDMP Cayla Rising as Reviewed:  Review User Review Instant Review Result   WEST, Micah Singh 2/8/2022  1:45 PM Reviewed PDMP [1]     Diagnoses and all orders for this visit:    1. Morbid obesity with body mass index (BMI) of 50.0 to 59.9 in adult [facility])  Comments:  Still encouraging weight loss. But patient does not want to talk much about that. 2. Chronic tension-type headache, not intractable  Comments:  Discussed trying to switch to something safer than fiorcet with the other medications she is on. Will try Ubrelvy. Orders:  -     butalbital-acetaminophen-caff (FIORICET) -40 mg per capsule; TAKE 1 CAPSULE(S) EVERY 4- 6 HOURS BY ORAL ROUTE AS NEEDED. -     ubrogepant [place]) 50 mg tablet; Take 1 tab po at onset of migraine. May repeat dose in 2 hours if needed. Do not exceed 2 doses in 24 hour period. 3. Multiple joint pain  Comments: Following with pain management. On lyrica and oxycodone. Orders:  -     naloxone (Narcan) 4 mg/actuation nasal spray; Use 1 spray intranasally, then discard. Repeat with new spray every 2 min as needed for opioid overdose symptoms, alternating nostrils. 4. Primary osteoarthritis of both knees  Comments: Followed by Pain Management. 5. Use of cane as ambulatory aid         Follow-up and Dispositions    Return if symptoms worsen or fail to improve. Donell Rubio is being evaluated by a Virtual Visit (video visit) encounter to address concerns as mentioned above. A caregiver was present when appropriate. Due to this being a TeleHealth encounter (During DTXEK-43 public health emergency), evaluation of the following organ systems was limited: Vitals/Constitutional/EENT/Resp/CV/GI//MS/Neuro/Skin/Heme-Lymph-Imm.   Pursuant to the emergency declaration under the [address] [place] [address], [address] [place] [address] authority and the [employer] and [employer] Act, this Virtual Visit was conducted with patient's (and/or legal guardian's) consent, to reduce the patient's risk of exposure to COVID-19 and provide necessary medical care. The patient (and/or legal guardian) has also been advised to contact this office for worsening conditions or problems, and seek emergency medical treatment and/or call 911 if deemed necessary. Patient identification was verified at the start of the visit: YES    Services were provided through a video synchronous discussion virtually to substitute for in-person clinic visit. Patient was located at home and provider was located in office or at home.        Shari Santizo MD  [address] [place] [address]

## 2022-02-16 DIAGNOSIS — F51.5 NIGHTMARE DISORDER: ICD-10-CM

## 2022-02-16 DIAGNOSIS — J45.40 MODERATE PERSISTENT ASTHMA, UNCOMPLICATED: ICD-10-CM

## 2022-02-16 DIAGNOSIS — Z72.0 TOBACCO ABUSE: ICD-10-CM

## 2022-02-17 RX ORDER — IBUPROFEN 200 MG
TABLET ORAL
Qty: 28 PATCH | Refills: 0 | Status: SHIPPED | OUTPATIENT
Start: 2022-02-17

## 2022-02-17 RX ORDER — PRAZOSIN HYDROCHLORIDE 5 MG/1
CAPSULE ORAL
Qty: 30 CAPSULE | Refills: 2 | Status: SHIPPED | OUTPATIENT
Start: 2022-02-17 | End: 2022-05-19

## 2022-02-17 RX ORDER — IPRATROPIUM BROMIDE AND ALBUTEROL SULFATE 2.5; .5 MG/3ML; MG/3ML
SOLUTION RESPIRATORY (INHALATION)
Qty: 360 ML | Refills: 1 | Status: SHIPPED | OUTPATIENT
Start: 2022-02-17 | End: 2022-05-19

## 2022-02-17 RX ORDER — IBUPROFEN 200 MG
1 TABLET ORAL EVERY 24 HOURS
Qty: 30 PATCH | Refills: 0 | Status: SHIPPED | OUTPATIENT
Start: 2022-02-17 | End: 2022-03-19

## 2022-02-17 RX ORDER — NICOTINE 7MG/24HR
1 PATCH, TRANSDERMAL 24 HOURS TRANSDERMAL EVERY 24 HOURS
Qty: 30 PATCH | Refills: 0 | Status: SHIPPED | OUTPATIENT
Start: 2022-02-17 | End: 2022-03-19

## 2022-02-22 ENCOUNTER — TELEPHONE (OUTPATIENT)
Dept: PRIMARY CARE CLINIC | Age: 47
End: 2022-02-22

## 2022-02-22 NOTE — TELEPHONE ENCOUNTER
PA Case: [id number], Status: Approved, Coverage Starts on: 2/15/2022 12:00:00 AM, Coverage Ends on: 2/15/2023 12:00:00 AM.----Ubrelvy 50 mg

## 2022-03-18 PROBLEM — G44.229 CHRONIC TENSION-TYPE HEADACHE, NOT INTRACTABLE: Status: ACTIVE | Noted: 2020-08-13

## 2022-03-18 PROBLEM — R07.89 OTHER CHEST PAIN: Status: ACTIVE | Noted: 2021-09-17

## 2022-03-18 PROBLEM — L30.9 ECZEMA: Status: ACTIVE | Noted: 2020-08-13

## 2022-03-18 PROBLEM — G47.00 INSOMNIA: Status: ACTIVE | Noted: 2020-08-13

## 2022-03-18 PROBLEM — Z87.891 PERSONAL HISTORY OF NICOTINE DEPENDENCE: Status: ACTIVE | Noted: 2021-09-17

## 2022-03-19 PROBLEM — G56.03 BILATERAL CARPAL TUNNEL SYNDROME: Status: ACTIVE | Noted: 2020-08-13

## 2022-03-19 PROBLEM — M75.41 IMPINGEMENT SYNDROME OF RIGHT SHOULDER: Status: ACTIVE | Noted: 2019-06-25

## 2022-03-19 PROBLEM — E66.01 MORBID OBESITY WITH BODY MASS INDEX (BMI) OF 50.0 TO 59.9 IN ADULT (HCC): Status: ACTIVE | Noted: 2020-08-13

## 2022-03-19 PROBLEM — Z72.0 TOBACCO ABUSE: Status: ACTIVE | Noted: 2022-01-27

## 2022-03-19 PROBLEM — M51.369 DEGENERATION OF LUMBAR INTERVERTEBRAL DISC: Status: ACTIVE | Noted: 2020-08-13

## 2022-03-19 PROBLEM — M51.36 DEGENERATION OF LUMBAR INTERVERTEBRAL DISC: Status: ACTIVE | Noted: 2020-08-13

## 2022-03-19 PROBLEM — M25.50 MULTIPLE JOINT PAIN: Status: ACTIVE | Noted: 2020-08-13

## 2022-03-19 PROBLEM — J45.40 MODERATE PERSISTENT ASTHMA, UNCOMPLICATED: Status: ACTIVE | Noted: 2020-08-13

## 2022-03-19 PROBLEM — R11.0 NAUSEA: Status: ACTIVE | Noted: 2020-08-13

## 2022-03-19 PROBLEM — F51.5 NIGHTMARE DISORDER: Status: ACTIVE | Noted: 2021-02-14

## 2022-03-19 PROBLEM — I10 ESSENTIAL HYPERTENSION: Status: ACTIVE | Noted: 2020-08-13

## 2022-03-19 PROBLEM — M17.9 OSTEOARTHRITIS OF KNEE: Status: ACTIVE | Noted: 2020-08-13

## 2022-03-20 PROBLEM — Z99.89 USE OF CANE AS AMBULATORY AID: Status: ACTIVE | Noted: 2021-02-14

## 2022-04-20 DIAGNOSIS — G44.229 CHRONIC TENSION-TYPE HEADACHE, NOT INTRACTABLE: ICD-10-CM

## 2022-04-21 RX ORDER — BUTALBITAL, ACETAMINOPHEN AND CAFFEINE 300; 40; 50 MG/1; MG/1; MG/1
CAPSULE ORAL
Qty: 40 CAPSULE | Refills: 1 | OUTPATIENT
Start: 2022-04-21

## 2022-04-21 NOTE — TELEPHONE ENCOUNTER
Pls call patient. At last visit we discussed coming off fioricet and switching to Rolette given interactions.

## 2022-05-17 ENCOUNTER — VIRTUAL VISIT (OUTPATIENT)
Dept: PRIMARY CARE CLINIC | Age: 47
End: 2022-05-17
Payer: MEDICAID

## 2022-05-17 DIAGNOSIS — G44.229 CHRONIC TENSION-TYPE HEADACHE, NOT INTRACTABLE: Primary | ICD-10-CM

## 2022-05-17 PROCEDURE — 99213 OFFICE O/P EST LOW 20 MIN: CPT | Performed by: FAMILY MEDICINE

## 2022-05-17 RX ORDER — RISPERIDONE 1 MG/1
1 TABLET, FILM COATED ORAL
COMMUNITY
Start: 2022-04-29

## 2022-05-17 RX ORDER — BUTALBITAL, ACETAMINOPHEN AND CAFFEINE 300; 40; 50 MG/1; MG/1; MG/1
CAPSULE ORAL
Qty: 5 CAPSULE | Refills: 0 | Status: SHIPPED | OUTPATIENT
Start: 2022-05-17 | End: 2022-05-26 | Stop reason: CLARIF

## 2022-05-17 NOTE — PROGRESS NOTES
Pt dcd to home with family, discharge instruction given to parents and voices understanding, prescription given, denies any concern There were no vitals taken for this visit. Chief Complaint   Patient presents with    Medication Refill     1. \"Have you been to the ER, urgent care clinic since your last visit? Hospitalized since your last visit? \" No    2. \"Have you seen or consulted any other health care providers outside of the [address] [place] [address] since your last visit? \" No     3. For patients aged 39-70: Has the patient had a colonoscopy / FIT/ Cologuard? No      If the patient is female:    4. For patients aged 41-77: Has the patient had a mammogram within the past 2 years? No      5. For patients aged 21-65: Has the patient had a pap smear?  No

## 2022-05-17 NOTE — PROGRESS NOTES
HPI     Chief Complaint   Patient presents with    Medication Refill        HPI:  Barb Rodriguez is a 48 y.o. female who has a history of Obesity, Chronic back pain, Bilateral Tricompartmental Knee Arthritis, HTN, Fibroids. Migraines: patient reports hx of migraines and wants refill of fioricet. She follows with Neurology. It is unclear why they will not provide prescription for this. She says migraines occur fairly often but then says she only uses the fiorcet prn. I previously discussed trying an alternative with patient, such as Ubrelvy. She has used it sporadically but prefers the fioricet. Review of Systems   Constitutional: Negative for chills and fever. Cardiovascular: Negative for chest pain. Neurological: Positive for headaches. Negative for dizziness. Reviewed PmHx, FmHx, SocHx as well as meds and allergies, updated and dated in the chart. Objective     Vital Signs: (As obtained by patient/caregiver at home)  There were no vitals taken for this visit.    Patient-Reported Vitals 2/3/2021   Patient-Reported Weight 338       [INSTRUCTIONS:  \"[x]\" Indicates a positive item  \"[]\" Indicates a negative item  -- DELETE ALL ITEMS NOT EXAMINED]    Constitutional: [] Appears well-developed and well-nourished [x] No apparent distress      [] Abnormal -     Mental status: [x] Alert and awake  [x] Oriented to person/place/time [x] Able to follow commands    [] Abnormal -     Eyes:   EOM    []  Normal    [] Abnormal -   Sclera  []  Normal    [] Abnormal -          Discharge []  None visible   [] Abnormal -     HENT: [] Normocephalic, atraumatic  [] Abnormal -   [] Mouth/Throat: Mucous membranes are moist    External Ears [] Normal  [] Abnormal -    Neck: [] No visualized mass [] Abnormal -     Pulmonary/Chest: [x] Respiratory effort normal   [] No visualized signs of difficulty breathing or respiratory distress        [] Abnormal -      Musculoskeletal:   [] Normal gait with no signs of ataxia         [] Normal range of motion of neck        [] Abnormal -     Neurological:        [] No Facial Asymmetry (Cranial nerve 7 motor function) (limited exam due to video visit)          [] No gaze palsy        [] Abnormal -          Skin:        [] No significant exanthematous lesions or discoloration noted on facial skin         [] Abnormal -            Psychiatric:       [x] Normal Affect [] Abnormal -        [x] No Hallucinations    Other pertinent observable physical exam findings:-          Assessment and Plan     I had a long discussion with patient and we discussed her needing to follow up with Neurology regarding the plan of care for her migraine management. She is already on gabapentin and Oxycodone. I discussed alternate options that are safer along with these medications. She will follow up with Neurology and I provided her with #5 only until she can get an appt with her as she says she uses prn. Diagnoses and all orders for this visit:    1. Chronic tension-type headache, not intractable  -     butalbital-acetaminophen-caff (FIORICET) -40 mg per capsule; TAKE 1 CAPSULE(S) EVERY 4- 6 HOURS BY ORAL ROUTE AS NEEDED. Ger Morales is being evaluated by a Virtual Visit (video visit) encounter to address concerns as mentioned above. A caregiver was present when appropriate. Due to this being a TeleHealth encounter (During YJQ-45 public health emergency), evaluation of the following organ systems was limited: Vitals/Constitutional/EENT/Resp/CV/GI//MS/Neuro/Skin/Heme-Lymph-Imm. Pursuant to the emergency declaration under the [address] [place] [address] authority and the [employer] and [employer] Act, this Virtual Visit was conducted with patient's (and/or legal guardian's) consent, to reduce the patient's risk of exposure to COVID-19 and provide necessary medical care.   The patient (and/or legal guardian) has also been advised to contact this office for worsening conditions or problems, and seek emergency medical treatment and/or call 911 if deemed necessary. Patient identification was verified at the start of the visit: YES    Services were provided through a video synchronous discussion virtually to substitute for in-person clinic visit. Patient was located at home and provider was located in office or at home.        Ida Howell MD  [address] [place] [address]

## 2022-05-26 DIAGNOSIS — G44.229 CHRONIC TENSION-TYPE HEADACHE, NOT INTRACTABLE: ICD-10-CM

## 2022-05-26 RX ORDER — BUTALBITAL, ACETAMINOPHEN AND CAFFEINE 50; 325; 40 MG/1; MG/1; MG/1
1 TABLET ORAL
Qty: 5 TABLET | Refills: 0 | Status: SHIPPED | OUTPATIENT
Start: 2022-05-26 | End: 2022-05-26 | Stop reason: SDUPTHER

## 2022-05-26 RX ORDER — BUTALBITAL, ACETAMINOPHEN AND CAFFEINE 50; 325; 40 MG/1; MG/1; MG/1
1 TABLET ORAL
Qty: 5 TABLET | Refills: 0 | Status: SHIPPED | OUTPATIENT
Start: 2022-05-26

## 2022-05-26 NOTE — TELEPHONE ENCOUNTER
Received a fax from pharmacy regarding fioricet capsules no longer covered. I switched to fioricet tablets.     Dr. El Henry

## 2022-05-27 ENCOUNTER — TELEPHONE (OUTPATIENT)
Dept: PRIMARY CARE CLINIC | Age: 47
End: 2022-05-27

## 2022-05-27 RX ORDER — ARM BRACE
2 EACH MISCELLANEOUS DAILY
Qty: 2 EACH | Refills: 0 | Status: SHIPPED | OUTPATIENT
Start: 2022-05-27

## 2022-05-27 NOTE — TELEPHONE ENCOUNTER
Patient is asking if you have sent in a RX for wrist braces and if so, where? Dorsal Nasal Flap Text: The defect edges were debeveled with a #15 scalpel blade.  Given the location of the defect and the proximity to free margins a dorsal nasal flap was deemed most appropriate.  Using a sterile surgical marker, an appropriate dorsal nasal flap was drawn around the defect.    The area thus outlined was incised deep to adipose tissue with a #15 scalpel blade.  The skin margins were undermined to an appropriate distance in all directions utilizing iris scissors.

## 2022-06-01 DIAGNOSIS — I10 ESSENTIAL HYPERTENSION: Chronic | ICD-10-CM

## 2022-06-01 RX ORDER — CLONIDINE HYDROCHLORIDE 0.1 MG/1
TABLET ORAL
Qty: 180 TABLET | Refills: 1 | Status: SHIPPED | OUTPATIENT
Start: 2022-06-01 | End: 2022-10-14

## 2022-06-02 ENCOUNTER — TELEPHONE (OUTPATIENT)
Dept: PRIMARY CARE CLINIC | Age: 47
End: 2022-06-02

## 2022-06-22 ENCOUNTER — TELEPHONE (OUTPATIENT)
Dept: PRIMARY CARE CLINIC | Age: 47
End: 2022-06-22

## 2022-06-22 NOTE — TELEPHONE ENCOUNTER
Pt says [facility] is still waiting on diagnosis. Pt has severe carpal tunnel in both hands.

## 2022-10-06 DIAGNOSIS — J45.40 MODERATE PERSISTENT ASTHMA, UNCOMPLICATED: ICD-10-CM

## 2022-10-06 DIAGNOSIS — I10 ESSENTIAL HYPERTENSION: Chronic | ICD-10-CM

## 2022-10-06 DIAGNOSIS — K21.9 GASTROESOPHAGEAL REFLUX DISEASE, UNSPECIFIED WHETHER ESOPHAGITIS PRESENT: ICD-10-CM

## 2022-10-06 DIAGNOSIS — J30.1 SEASONAL ALLERGIC RHINITIS DUE TO POLLEN: ICD-10-CM

## 2022-10-06 RX ORDER — FLUTICASONE PROPIONATE 50 MCG
SPRAY, SUSPENSION (ML) NASAL
Qty: 1 EACH | Refills: 5 | Status: SHIPPED | OUTPATIENT
Start: 2022-10-06

## 2022-10-06 RX ORDER — SPIRONOLACTONE 25 MG/1
TABLET ORAL
Qty: 180 TABLET | Refills: 1 | Status: SHIPPED | OUTPATIENT
Start: 2022-10-06

## 2022-10-06 RX ORDER — FAMOTIDINE 20 MG/1
TABLET, FILM COATED ORAL
Qty: 180 TABLET | Refills: 1 | Status: SHIPPED | OUTPATIENT
Start: 2022-10-06

## 2022-10-06 RX ORDER — IPRATROPIUM BROMIDE AND ALBUTEROL SULFATE 2.5; .5 MG/3ML; MG/3ML
SOLUTION RESPIRATORY (INHALATION)
Qty: 360 ML | Refills: 1 | Status: SHIPPED | OUTPATIENT
Start: 2022-10-06

## 2022-10-14 DIAGNOSIS — I10 ESSENTIAL HYPERTENSION: Chronic | ICD-10-CM

## 2022-10-14 DIAGNOSIS — I10 ESSENTIAL HYPERTENSION: ICD-10-CM

## 2022-10-14 RX ORDER — CLONIDINE HYDROCHLORIDE 0.1 MG/1
TABLET ORAL
Qty: 180 TABLET | Refills: 1 | Status: SHIPPED | OUTPATIENT
Start: 2022-10-14

## 2022-10-14 RX ORDER — FUROSEMIDE 20 MG/1
TABLET ORAL
Qty: 180 TABLET | Refills: 1 | Status: SHIPPED | OUTPATIENT
Start: 2022-10-14

## 2022-11-22 DIAGNOSIS — F51.5 NIGHTMARE DISORDER: ICD-10-CM

## 2022-11-22 RX ORDER — PRAZOSIN HYDROCHLORIDE 5 MG/1
CAPSULE ORAL
Qty: 30 CAPSULE | Refills: 5 | Status: SHIPPED | OUTPATIENT
Start: 2022-11-22

## 2022-12-20 DIAGNOSIS — J45.40 MODERATE PERSISTENT ASTHMA, UNCOMPLICATED: ICD-10-CM

## 2022-12-20 RX ORDER — IPRATROPIUM BROMIDE AND ALBUTEROL SULFATE 2.5; .5 MG/3ML; MG/3ML
SOLUTION RESPIRATORY (INHALATION)
Qty: 360 ML | Refills: 1 | Status: SHIPPED | OUTPATIENT
Start: 2022-12-20

## 2023-02-07 ENCOUNTER — TRANSCRIBE ORDER (OUTPATIENT)
Dept: GENERAL RADIOLOGY | Age: 48
End: 2023-02-07

## 2023-02-07 ENCOUNTER — HOSPITAL ENCOUNTER (OUTPATIENT)
Dept: GENERAL RADIOLOGY | Age: 48
Discharge: HOME | End: 2023-02-07
Payer: COMMERCIAL

## 2023-02-07 DIAGNOSIS — M19.019 PRIMARY LOCALIZED OSTEOARTHROSIS OF SHOULDER REGION: ICD-10-CM

## 2023-02-07 DIAGNOSIS — M19.019 PRIMARY LOCALIZED OSTEOARTHROSIS OF SHOULDER REGION: Primary | ICD-10-CM

## 2023-02-07 PROCEDURE — 73030 X-RAY EXAM OF SHOULDER: CPT

## 2023-02-25 DIAGNOSIS — I10 ESSENTIAL HYPERTENSION: ICD-10-CM

## 2023-02-27 RX ORDER — FUROSEMIDE 20 MG/1
TABLET ORAL
Qty: 180 TABLET | Refills: 1 | Status: SHIPPED | OUTPATIENT
Start: 2023-02-27

## 2023-03-07 DIAGNOSIS — K21.9 GASTROESOPHAGEAL REFLUX DISEASE, UNSPECIFIED WHETHER ESOPHAGITIS PRESENT: ICD-10-CM

## 2023-03-07 DIAGNOSIS — J45.40 MODERATE PERSISTENT ASTHMA, UNCOMPLICATED: ICD-10-CM

## 2023-03-07 RX ORDER — FAMOTIDINE 20 MG/1
TABLET, FILM COATED ORAL
Qty: 180 TABLET | Refills: 1 | Status: SHIPPED | OUTPATIENT
Start: 2023-03-07

## 2023-03-07 RX ORDER — IPRATROPIUM BROMIDE AND ALBUTEROL SULFATE 2.5; .5 MG/3ML; MG/3ML
SOLUTION RESPIRATORY (INHALATION)
Qty: 360 ML | Refills: 1 | Status: SHIPPED | OUTPATIENT
Start: 2023-03-07

## 2023-03-20 DIAGNOSIS — F51.5 NIGHTMARE DISORDER: ICD-10-CM

## 2023-03-21 RX ORDER — PRAZOSIN HYDROCHLORIDE 2 MG/1
CAPSULE ORAL
Qty: 60 CAPSULE | Refills: 1 | Status: SHIPPED | OUTPATIENT
Start: 2023-03-21

## 2023-04-03 NOTE — TELEPHONE ENCOUNTER
Called and left message. Anesthesia Type: 1% lidocaine with epinephrine Anesthesia Volume In Cc: 1 Detail Level: Zone Consent: The patient's consent was obtained including but not limited to risks of crusting, scabbing, blistering, scarring, darker or lighter pigmentary change, recurrence, incomplete removal and infection. Post-Care Instructions: I reviewed with the patient in detail post-care instructions. Patient is to wear sunprotection, and avoid picking at any of the treated lesions. Pt may apply Vaseline to crusted or scabbing areas

## 2023-04-19 DIAGNOSIS — I10 ESSENTIAL HYPERTENSION: Chronic | ICD-10-CM

## 2023-04-19 RX ORDER — SPIRONOLACTONE 25 MG/1
TABLET ORAL
Qty: 60 TABLET | Refills: 2 | Status: SHIPPED | OUTPATIENT
Start: 2023-04-19

## 2023-05-01 DIAGNOSIS — I10 ESSENTIAL HYPERTENSION: Chronic | ICD-10-CM

## 2023-05-02 RX ORDER — SPIRONOLACTONE 25 MG/1
TABLET ORAL
Qty: 180 TABLET | Refills: 0 | Status: SHIPPED | OUTPATIENT
Start: 2023-05-02

## 2023-07-25 RX ORDER — PRAZOSIN HYDROCHLORIDE 5 MG/1
CAPSULE ORAL
Qty: 30 CAPSULE | Refills: 0 | Status: SHIPPED | OUTPATIENT
Start: 2023-07-25

## 2023-07-25 RX ORDER — PRAZOSIN HYDROCHLORIDE 2 MG/1
CAPSULE ORAL
Qty: 30 CAPSULE | Refills: 0 | Status: SHIPPED | OUTPATIENT
Start: 2023-07-25

## 2023-07-25 NOTE — TELEPHONE ENCOUNTER
----- Message from Faustino Winters sent at 7/24/2023  2:45 PM EDT -----  Subject: Refill Request    QUESTIONS  Name of Medication? prazosin (MINIPRESS) 5 MG capsule  Patient-reported dosage and instructions? takes once a day  How many days do you have left? 0  Preferred Pharmacy? [id number]  Pharmacy phone number (if available)? [phone]  ---------------------------------------------------------------------------  --------------,  Name of Medication? prazosin (MINIPRESS) 2 MG capsule  Patient-reported dosage and instructions? takes once a day  How many days do you have left? 0  Preferred Pharmacy? [id number]  Pharmacy phone number (if available)? [phone]  Additional Information for Provider? out of meds. ---------------------------------------------------------------------------  --------------  Kia Watson INFO  What is the best way for the office to contact you? OK to leave message on   voicemail  Preferred Call Back Phone Number? [phone]  ---------------------------------------------------------------------------  --------------  SCRIPT ANSWERS  Relationship to Patient?  Self

## 2023-08-02 ENCOUNTER — OFFICE VISIT (OUTPATIENT)
Dept: PRIMARY CARE CLINIC | Facility: CLINIC | Age: 48
End: 2023-08-02
Payer: MEDICAID

## 2023-08-02 VITALS
RESPIRATION RATE: 16 BRPM | TEMPERATURE: 97.9 F | HEART RATE: 94 BPM | DIASTOLIC BLOOD PRESSURE: 78 MMHG | BODY MASS INDEX: 47.09 KG/M2 | SYSTOLIC BLOOD PRESSURE: 138 MMHG | WEIGHT: 293 LBS | HEIGHT: 66 IN

## 2023-08-02 DIAGNOSIS — B35.3 TINEA PEDIS OF BOTH FEET: ICD-10-CM

## 2023-08-02 DIAGNOSIS — I10 ESSENTIAL (PRIMARY) HYPERTENSION: ICD-10-CM

## 2023-08-02 DIAGNOSIS — E66.01 MORBID (SEVERE) OBESITY DUE TO EXCESS CALORIES (HCC): ICD-10-CM

## 2023-08-02 DIAGNOSIS — Z12.31 ENCOUNTER FOR SCREENING MAMMOGRAM FOR BREAST CANCER: ICD-10-CM

## 2023-08-02 DIAGNOSIS — H91.93 BILATERAL HEARING LOSS, UNSPECIFIED HEARING LOSS TYPE: ICD-10-CM

## 2023-08-02 DIAGNOSIS — Z72.0 TOBACCO ABUSE: ICD-10-CM

## 2023-08-02 DIAGNOSIS — F32.9 MAJOR DEPRESSIVE DISORDER WITH SINGLE EPISODE, REMISSION STATUS UNSPECIFIED: ICD-10-CM

## 2023-08-02 DIAGNOSIS — Z12.11 SCREEN FOR COLON CANCER: ICD-10-CM

## 2023-08-02 DIAGNOSIS — F51.5 NIGHTMARE DISORDER: ICD-10-CM

## 2023-08-02 DIAGNOSIS — E78.2 MIXED HYPERLIPIDEMIA: ICD-10-CM

## 2023-08-02 DIAGNOSIS — Z00.00 ANNUAL PHYSICAL EXAM: Primary | ICD-10-CM

## 2023-08-02 PROCEDURE — 99215 OFFICE O/P EST HI 40 MIN: CPT | Performed by: NURSE PRACTITIONER

## 2023-08-02 PROCEDURE — 3078F DIAST BP <80 MM HG: CPT | Performed by: NURSE PRACTITIONER

## 2023-08-02 PROCEDURE — 3075F SYST BP GE 130 - 139MM HG: CPT | Performed by: NURSE PRACTITIONER

## 2023-08-02 PROCEDURE — 99396 PREV VISIT EST AGE 40-64: CPT | Performed by: NURSE PRACTITIONER

## 2023-08-02 RX ORDER — CLONIDINE HYDROCHLORIDE 0.1 MG/1
0.1 TABLET ORAL 2 TIMES DAILY
Qty: 60 TABLET | Refills: 1 | Status: SHIPPED | OUTPATIENT
Start: 2023-08-02

## 2023-08-02 RX ORDER — FLUOXETINE HYDROCHLORIDE 40 MG/1
40 CAPSULE ORAL DAILY
Qty: 30 CAPSULE | Refills: 1 | Status: SHIPPED | OUTPATIENT
Start: 2023-08-02

## 2023-08-02 RX ORDER — DUPILUMAB 300 MG/2ML
INJECTION, SOLUTION SUBCUTANEOUS
Qty: 3.92 ML | Status: CANCELLED | OUTPATIENT
Start: 2023-08-02

## 2023-08-02 RX ORDER — FLUTICASONE PROPIONATE 50 MCG
SPRAY, SUSPENSION (ML) NASAL
Qty: 16 G | Refills: 0 | Status: SHIPPED | OUTPATIENT
Start: 2023-08-02

## 2023-08-02 SDOH — ECONOMIC STABILITY: INCOME INSECURITY: HOW HARD IS IT FOR YOU TO PAY FOR THE VERY BASICS LIKE FOOD, HOUSING, MEDICAL CARE, AND HEATING?: NOT VERY HARD

## 2023-08-02 SDOH — ECONOMIC STABILITY: HOUSING INSECURITY
IN THE LAST 12 MONTHS, WAS THERE A TIME WHEN YOU DID NOT HAVE A STEADY PLACE TO SLEEP OR SLEPT IN A SHELTER (INCLUDING NOW)?: NO

## 2023-08-02 SDOH — ECONOMIC STABILITY: FOOD INSECURITY: WITHIN THE PAST 12 MONTHS, YOU WORRIED THAT YOUR FOOD WOULD RUN OUT BEFORE YOU GOT MONEY TO BUY MORE.: NEVER TRUE

## 2023-08-02 SDOH — ECONOMIC STABILITY: FOOD INSECURITY: WITHIN THE PAST 12 MONTHS, THE FOOD YOU BOUGHT JUST DIDN'T LAST AND YOU DIDN'T HAVE MONEY TO GET MORE.: NEVER TRUE

## 2023-08-02 ASSESSMENT — PATIENT HEALTH QUESTIONNAIRE - PHQ9
SUM OF ALL RESPONSES TO PHQ QUESTIONS 1-9: 9
10. IF YOU CHECKED OFF ANY PROBLEMS, HOW DIFFICULT HAVE THESE PROBLEMS MADE IT FOR YOU TO DO YOUR WORK, TAKE CARE OF THINGS AT HOME, OR GET ALONG WITH OTHER PEOPLE: 1
8. MOVING OR SPEAKING SO SLOWLY THAT OTHER PEOPLE COULD HAVE NOTICED. OR THE OPPOSITE, BEING SO FIGETY OR RESTLESS THAT YOU HAVE BEEN MOVING AROUND A LOT MORE THAN USUAL: 0
SUM OF ALL RESPONSES TO PHQ QUESTIONS 1-9: 9
3. TROUBLE FALLING OR STAYING ASLEEP: 1
2. FEELING DOWN, DEPRESSED OR HOPELESS: 2
SUM OF ALL RESPONSES TO PHQ QUESTIONS 1-9: 9
SUM OF ALL RESPONSES TO PHQ9 QUESTIONS 1 & 2: 4
6. FEELING BAD ABOUT YOURSELF - OR THAT YOU ARE A FAILURE OR HAVE LET YOURSELF OR YOUR FAMILY DOWN: 0
SUM OF ALL RESPONSES TO PHQ QUESTIONS 1-9: 9
9. THOUGHTS THAT YOU WOULD BE BETTER OFF DEAD, OR OF HURTING YOURSELF: 0
7. TROUBLE CONCENTRATING ON THINGS, SUCH AS READING THE NEWSPAPER OR WATCHING TELEVISION: 2
5. POOR APPETITE OR OVEREATING: 1
4. FEELING TIRED OR HAVING LITTLE ENERGY: 1
1. LITTLE INTEREST OR PLEASURE IN DOING THINGS: 2

## 2023-08-02 ASSESSMENT — ENCOUNTER SYMPTOMS
EYES NEGATIVE: 1
RESPIRATORY NEGATIVE: 1
BACK PAIN: 1
GASTROINTESTINAL NEGATIVE: 1

## 2023-08-02 NOTE — PROGRESS NOTES
Carlo Pandya Identified Patient with two Patient identifiers(name and ). 1. Have you been to the ER, urgent care clinic since your last visit? Hospitalized since your last visit? No    2. Have you seen or consulted any other health care providers outside of the [address] [place] [address] since your last visit? No     3. For patients aged 43-73: Has the patient had a colonoscopy / FIT/ Cologuard? No    If the patient is female:    4. For patients aged 43-66: Has the patient had a mammogram within the past 2 years? No      5. For patients aged 21-65: Has the patient had a pap smear? No   There were no vitals taken for this visit. Chief Complaint   Patient presents with    Annual Exam     Physical; pt states she has dark spots on both feet and think may have fungus toes itchy. Bad back right side feels terrible. Health Maintenance Due   Topic Date Due    COVID-19 Vaccine (1) Never done    Pneumococcal 0-64 years Vaccine (1 - PCV) Never done    HIV screen  Never done    Hepatitis C screen  Never done    DTaP/Tdap/Td vaccine (1 - Tdap) Never done    Cervical cancer screen  Never done    Colorectal Cancer Screen  Never done    Depression Monitoring  2023    Breast cancer screen  2023    Shingles vaccine (1 of 2) Never done    Flu vaccine (1) Never done          No questionnaires available. .. .......................................... Carlo Pandya

## 2023-08-02 NOTE — PROGRESS NOTES
Ashley Perez is a 46 y.o. female presents for a physical exam.  She is also due for follow up care of chronic issues. She is willing to do both appointments today and realizes that there may be a co-pay for the follow-up portion of the visit. ASSESSMENT and PLAN  Fabrizio Choi was seen today for annual exam.    Diagnoses and all orders for this visit:    Annual physical exam  -     CBC  -     Comprehensive Metabolic Panel  -     Lipid Panel    Encounter for screening mammogram for breast cancer  -     JOANN DIGITAL SCREEN W OR WO CAD BILATERAL; Future    Screen for colon cancer  -     [facility] - Kaylie Cordoba MD, Gastroenterology, [facility]    Tobacco abuse    Nightmare disorder  Comments:  stable with use of the prazosin. Morbid (severe) obesity due to excess calories ([address])  Comments:   working on weight loss. Discussed dietary changes that would be beneficial. She states it is expensive to eat healthy. Trying to add exercise when she can. Essential (primary) hypertension  Comments:  BP at goal today  Orders:  -     cloNIDine (CATAPRES) 0.1 MG tablet; Take 1 tablet by mouth 2 times daily    Major depressive disorder with single episode, remission status unspecified  Comments:  she is unable to get in touch with her psychiatrist. will refill prozac until she can get in with them  Orders:  -     FLUoxetine (PROZAC) 40 MG capsule; Take 1 capsule by mouth daily    Mixed hyperlipidemia  Comments:  will recheck lipids. Tinea pedis of both feet  -     [facility] - Shyanne Mckinnon DPM, Podiatry, [place]    Bilateral hearing loss, unspecified hearing loss type  Comments:  She reports hearing loss, sometimes a ringing sensation. Requesting referral to ENT.   Orders:  -     [address] [facility] Ear, Nose[facility] Th[facility]at, [facility] Allergy Care, [facility]    Other orders  -     fluticasone (FLONASE) 50 MCG/ACT nasal spray; INSTILL 2 SPRAYS IN EACH NOSTRIL BY INTRANASAL ROUTE ONCE DAILY AS NEEDED         No follow-up

## 2023-08-03 LAB
ALBUMIN SERPL-MCNC: 4.1 G/DL (ref 3.9–4.9)
ALBUMIN/GLOB SERPL: 1.4 {RATIO} (ref 1.2–2.2)
ALP SERPL-CCNC: 118 IU/L (ref 44–121)
ALT SERPL-CCNC: 9 IU/L (ref 0–32)
AST SERPL-CCNC: 17 IU/L (ref 0–40)
BILIRUB SERPL-MCNC: 0.7 MG/DL (ref 0–1.2)
BUN SERPL-MCNC: 14 MG/DL (ref 6–24)
BUN/CREAT SERPL: 11 (ref 9–23)
CALCIUM SERPL-MCNC: 9 MG/DL (ref 8.7–10.2)
CHLORIDE SERPL-SCNC: 105 MMOL/L (ref 96–106)
CHOLEST SERPL-MCNC: 180 MG/DL (ref 100–199)
CO2 SERPL-SCNC: 20 MMOL/L (ref 20–29)
CREAT SERPL-MCNC: 1.31 MG/DL (ref 0.57–1)
EGFRCR SERPLBLD CKD-EPI 2021: 50 ML/MIN/1.73
ERYTHROCYTE [DISTWIDTH] IN BLOOD BY AUTOMATED COUNT: 14.8 % (ref 11.7–15.4)
GLOBULIN SER CALC-MCNC: 3 G/DL (ref 1.5–4.5)
GLUCOSE SERPL-MCNC: 91 MG/DL (ref 70–99)
HCT VFR BLD AUTO: 40.8 % (ref 34–46.6)
HDLC SERPL-MCNC: 38 MG/DL
HGB BLD-MCNC: 13.5 G/DL (ref 11.1–15.9)
LDLC SERPL CALC-MCNC: 124 MG/DL (ref 0–99)
MCH RBC QN AUTO: 27.1 PG (ref 26.6–33)
MCHC RBC AUTO-ENTMCNC: 33.1 G/DL (ref 31.5–35.7)
MCV RBC AUTO: 82 FL (ref 79–97)
PLATELET # BLD AUTO: 250 X10E3/UL (ref 150–450)
POTASSIUM SERPL-SCNC: 4.2 MMOL/L (ref 3.5–5.2)
PROT SERPL-MCNC: 7.1 G/DL (ref 6–8.5)
RBC # BLD AUTO: 4.98 X10E6/UL (ref 3.77–5.28)
SODIUM SERPL-SCNC: 141 MMOL/L (ref 134–144)
TRIGL SERPL-MCNC: 100 MG/DL (ref 0–149)
VLDLC SERPL CALC-MCNC: 18 MG/DL (ref 5–40)
WBC # BLD AUTO: 8.1 X10E3/UL (ref 3.4–10.8)

## 2023-08-07 RX ORDER — PRAZOSIN HYDROCHLORIDE 2 MG/1
CAPSULE ORAL
Qty: 30 CAPSULE | Refills: 0 | OUTPATIENT
Start: 2023-08-07

## 2023-08-07 RX ORDER — PRAZOSIN HYDROCHLORIDE 5 MG/1
CAPSULE ORAL
Qty: 30 CAPSULE | Refills: 0 | OUTPATIENT
Start: 2023-08-07

## 2023-08-31 RX ORDER — PRAZOSIN HYDROCHLORIDE 5 MG/1
CAPSULE ORAL
Qty: 30 CAPSULE | Refills: 0 | Status: SHIPPED | OUTPATIENT
Start: 2023-08-31

## 2023-08-31 RX ORDER — PRAZOSIN HYDROCHLORIDE 2 MG/1
CAPSULE ORAL
Qty: 30 CAPSULE | Refills: 0 | Status: SHIPPED | OUTPATIENT
Start: 2023-08-31

## 2023-09-08 RX ORDER — PRAZOSIN HYDROCHLORIDE 2 MG/1
CAPSULE ORAL
Qty: 30 CAPSULE | Refills: 0 | OUTPATIENT
Start: 2023-09-08

## 2023-09-26 RX ORDER — PRAZOSIN HYDROCHLORIDE 5 MG/1
CAPSULE ORAL
Qty: 30 CAPSULE | Refills: 0 | Status: SHIPPED | OUTPATIENT
Start: 2023-09-26

## 2023-10-04 ENCOUNTER — TELEPHONE (OUTPATIENT)
Dept: PRIMARY CARE CLINIC | Facility: CLINIC | Age: 48
End: 2023-10-04

## 2023-10-04 NOTE — TELEPHONE ENCOUNTER
Ashley Perez is requesting a refill on prazosin 2mg  .      Patient's last appointment was 8/2/2023  Next visit is scheduled for 10/13/2023   Please send me  [facility]/pharmacy #[id number]- [address]  Phone: [phone] Fax: [phone] to:

## 2023-10-05 RX ORDER — PRAZOSIN HYDROCHLORIDE 2 MG/1
CAPSULE ORAL
Qty: 30 CAPSULE | Refills: 0 | Status: SHIPPED | OUTPATIENT
Start: 2023-10-05

## 2023-10-13 ENCOUNTER — TELEMEDICINE (OUTPATIENT)
Dept: PRIMARY CARE CLINIC | Facility: CLINIC | Age: 48
End: 2023-10-13
Payer: MEDICAID

## 2023-10-13 DIAGNOSIS — F51.5 NIGHTMARE DISORDER: Primary | ICD-10-CM

## 2023-10-13 DIAGNOSIS — E78.2 MIXED HYPERLIPIDEMIA: ICD-10-CM

## 2023-10-13 PROCEDURE — 99214 OFFICE O/P EST MOD 30 MIN: CPT | Performed by: NURSE PRACTITIONER

## 2023-10-13 RX ORDER — PRAZOSIN HYDROCHLORIDE 2 MG/1
CAPSULE ORAL
Qty: 180 CAPSULE | Refills: 0 | Status: SHIPPED | OUTPATIENT
Start: 2023-10-13

## 2023-10-13 RX ORDER — MULTIVITAMIN/IRON/FOLIC ACID 18MG-0.4MG
TABLET ORAL
COMMUNITY
Start: 2023-09-16

## 2023-10-13 RX ORDER — PRAZOSIN HYDROCHLORIDE 5 MG/1
CAPSULE ORAL
Qty: 90 CAPSULE | Refills: 0 | Status: SHIPPED | OUTPATIENT
Start: 2023-10-13

## 2023-10-13 ASSESSMENT — ENCOUNTER SYMPTOMS
GASTROINTESTINAL NEGATIVE: 1
RESPIRATORY NEGATIVE: 1

## 2023-10-13 NOTE — PROGRESS NOTES
Identified Patient with two Patient identifiers(name and ). 1. Have you been to the ER, urgent care clinic since your last visit? Hospitalized since your last visit? No    2. Have you seen or consulted any other health care providers outside of the [address] [place] [address] since your last visit? No     3. For patients aged 43-73: Has the patient had a colonoscopy / FIT/ Cologuard? No    If the patient is female:    4. For patients aged 43-66: Has the patient had a mammogram within the past 2 years? Yes-No Care Gap Present      5. For patients aged 21-65: Has the patient had a pap smear? Yes-No Care Gap Present   There were no vitals taken for this visit. Chief Complaint   Patient presents with    Medication Refill       Health Maintenance Due   Topic Date Due    Hepatitis B vaccine (1 of 3 - 3-dose series) Never done    COVID-19 Vaccine (1) Never done    Pneumococcal 0-64 years Vaccine (1 - PCV) Never done    HIV screen  Never done    Hepatitis C screen  Never done    DTaP/Tdap/Td vaccine (1 - Tdap) Never done    Cervical cancer screen  Never done    Colorectal Cancer Screen  Never done    Breast cancer screen  2023    Shingles vaccine (1 of 2) Never done    Flu vaccine (1) Never done          No questionnaires available.
nebulizer solution INHALE 1 VIAL VIA NEBULIZER EVERY 4-6 HOURS AS NEEDED      montelukast (SINGULAIR) 10 MG tablet TAKE 1 TABLET BY MOUTH EVERY DAY      naloxone 4 MG/0.1ML LIQD nasal spray Use 1 spray intranasally, then discard. Repeat with new spray every 2 min as needed for opioid overdose symptoms, alternating nostrils. oxyCODONE-acetaminophen (PERCOCET)  MG per tablet Take 1 tablet by mouth. spironolactone (ALDACTONE) 25 MG tablet TAKE 1 TABLET BY MOUTH TWICE A DAY      topiramate (TOPAMAX) 100 MG tablet Take 1 tablet by mouth 2 times daily      [facility] MELATONIN 5 MG TABS tablet TAKE 1 TABLET BY MOUTH AT BEDTIME AS NEEDED FOR INSOMNIA. DO NOT COMBINE WITH BUTALBITAL. No current facility-administered medications for this visit. Reviewed and updated this visit:  Tobacco  Allergies  Meds  Problems  Med Hx  Surg Hx  Soc Hx  Fam Hx     Ozzie Breanna Aide, was evaluated through a synchronous (real-time) audio-video encounter. The patient (or guardian if applicable) is aware that this is a billable service, which includes applicable co-pays. This Virtual Visit was conducted with patient's (and/or legal guardian's) consent. Patient identification was verified, and a caregiver was present when appropriate. The patient was located at Home: [address]  Provider was located at Home ([facility]): Jose Mide were provided through a video synchronous discussion virtually to substitute for in-person encounter. --NATALIYA Acosta - CNP on 10/13/2023 at 3:27 PM    An electronic signature was used to authenticate this note.

## 2023-12-21 ENCOUNTER — TELEPHONE (OUTPATIENT)
Dept: PRIMARY CARE CLINIC | Facility: CLINIC | Age: 48
End: 2023-12-21

## 2023-12-21 NOTE — TELEPHONE ENCOUNTER
Hi Mccormick is requesting a refill on topamax, melatonine, flonase, fluoxetine.      Patient's last appointment was 10/13/2023  Next visit is scheduled for Visit date not found   [facility] [address]

## 2023-12-29 DIAGNOSIS — I10 ESSENTIAL (PRIMARY) HYPERTENSION: ICD-10-CM

## 2023-12-29 DIAGNOSIS — F51.5 NIGHTMARE DISORDER: ICD-10-CM

## 2024-01-03 RX ORDER — CLONIDINE HYDROCHLORIDE 0.1 MG/1
0.1 TABLET ORAL 2 TIMES DAILY
Qty: 60 TABLET | Refills: 0 | Status: CANCELLED | OUTPATIENT
Start: 2024-01-03

## 2024-01-03 RX ORDER — PRAZOSIN HYDROCHLORIDE 5 MG/1
CAPSULE ORAL
Qty: 90 CAPSULE | Refills: 0 | Status: CANCELLED | OUTPATIENT
Start: 2024-01-03

## 2024-01-08 RX ORDER — FLUTICASONE PROPIONATE 50 MCG
SPRAY, SUSPENSION (ML) NASAL
Qty: 15.8 G | Refills: 1 | Status: SHIPPED | OUTPATIENT
Start: 2024-01-08

## 2024-01-18 ENCOUNTER — TELEPHONE (OUTPATIENT)
Dept: PRIMARY CARE CLINIC | Facility: CLINIC | Age: 49
End: 2024-01-18

## 2024-01-19 RX ORDER — TOPIRAMATE 100 MG/1
100 TABLET, FILM COATED ORAL 2 TIMES DAILY
Qty: 60 TABLET | Refills: 1 | Status: SHIPPED | OUTPATIENT
Start: 2024-01-19

## 2024-01-20 DIAGNOSIS — F51.5 NIGHTMARE DISORDER: ICD-10-CM

## 2024-01-22 RX ORDER — PRAZOSIN HYDROCHLORIDE 5 MG/1
CAPSULE ORAL
Qty: 30 CAPSULE | Refills: 2 | Status: SHIPPED | OUTPATIENT
Start: 2024-01-22

## 2024-01-23 ENCOUNTER — TELEPHONE (OUTPATIENT)
Dept: PRIMARY CARE CLINIC | Facility: CLINIC | Age: 49
End: 2024-01-23

## 2024-01-23 RX ORDER — SPIRONOLACTONE 25 MG/1
25 TABLET ORAL 2 TIMES DAILY
Qty: 60 TABLET | Refills: 1 | Status: SHIPPED | OUTPATIENT
Start: 2024-01-23

## 2024-01-23 NOTE — TELEPHONE ENCOUNTER
Stefany Noble is requesting a refill on spironalactone.     Patient's last appointment was 10/13/2023  Next visit is scheduled for Visit date not found   Please send medication to:   [facility]/pharmacy #[id number] - [place] - [address] - P [phone] - F [phone] [address] [facility] [address]  Phone: [phone] Fax: [phone]

## 2024-01-28 DIAGNOSIS — F32.9 MAJOR DEPRESSIVE DISORDER WITH SINGLE EPISODE, REMISSION STATUS UNSPECIFIED: ICD-10-CM

## 2024-01-29 RX ORDER — FLUOXETINE HYDROCHLORIDE 40 MG/1
CAPSULE ORAL DAILY
Qty: 30 CAPSULE | Refills: 1 | Status: SHIPPED | OUTPATIENT
Start: 2024-01-29

## 2024-02-06 ENCOUNTER — TELEPHONE (OUTPATIENT)
Dept: PRIMARY CARE CLINIC | Facility: CLINIC | Age: 49
End: 2024-02-06

## 2024-02-07 DIAGNOSIS — F51.5 NIGHTMARE DISORDER: ICD-10-CM

## 2024-02-07 DIAGNOSIS — F32.9 MAJOR DEPRESSIVE DISORDER WITH SINGLE EPISODE, REMISSION STATUS UNSPECIFIED: ICD-10-CM

## 2024-02-07 RX ORDER — PRAZOSIN HYDROCHLORIDE 2 MG/1
CAPSULE ORAL
Qty: 180 CAPSULE | Refills: 0 | Status: SHIPPED | OUTPATIENT
Start: 2024-02-07

## 2024-02-07 RX ORDER — FLUOXETINE HYDROCHLORIDE 40 MG/1
40 CAPSULE ORAL DAILY
Qty: 30 CAPSULE | Refills: 2 | Status: SHIPPED | OUTPATIENT
Start: 2024-02-07

## 2024-02-07 RX ORDER — FAMOTIDINE 20 MG/1
20 TABLET, FILM COATED ORAL 2 TIMES DAILY
Qty: 60 TABLET | Refills: 2 | Status: SHIPPED | OUTPATIENT
Start: 2024-02-07

## 2024-02-14 ENCOUNTER — TELEPHONE (OUTPATIENT)
Dept: PRIMARY CARE CLINIC | Facility: CLINIC | Age: 49
End: 2024-02-14

## 2024-02-14 DIAGNOSIS — I10 ESSENTIAL (PRIMARY) HYPERTENSION: ICD-10-CM

## 2024-02-14 RX ORDER — CLONIDINE HYDROCHLORIDE 0.1 MG/1
0.1 TABLET ORAL 2 TIMES DAILY
Qty: 60 TABLET | Refills: 1 | Status: SHIPPED | OUTPATIENT
Start: 2024-02-14

## 2024-02-14 RX ORDER — CLONIDINE HYDROCHLORIDE 0.1 MG/1
0.1 TABLET ORAL 2 TIMES DAILY
Qty: 60 TABLET | Refills: 1 | Status: SHIPPED | OUTPATIENT
Start: 2024-02-14 | End: 2024-02-14

## 2024-02-22 ENCOUNTER — HOSPITAL ENCOUNTER (EMERGENCY)
Facility: HOSPITAL | Age: 49
Discharge: HOME | End: 2024-02-22
Attending: EMERGENCY MEDICINE
Payer: MEDICAID

## 2024-02-22 VITALS
OXYGEN SATURATION: 100 % | HEIGHT: 66 IN | TEMPERATURE: 97.9 F | WEIGHT: 293 LBS | BODY MASS INDEX: 47.09 KG/M2 | DIASTOLIC BLOOD PRESSURE: 85 MMHG | HEART RATE: 78 BPM | SYSTOLIC BLOOD PRESSURE: 132 MMHG | RESPIRATION RATE: 18 BRPM

## 2024-02-22 DIAGNOSIS — J06.9 ACUTE UPPER RESPIRATORY INFECTION: Primary | ICD-10-CM

## 2024-02-22 LAB
DEPRECATED S PYO AG THROAT QL EIA: NEGATIVE
FLUAV AG NPH QL IA: NEGATIVE
FLUBV AG NOSE QL IA: NEGATIVE
SARS-COV-2 RDRP RESP QL NAA+PROBE: NOT DETECTED
SOURCE: NORMAL

## 2024-02-22 PROCEDURE — 87635 SARS-COV-2 COVID-19 AMP PRB: CPT

## 2024-02-22 PROCEDURE — 99283 EMERGENCY DEPT VISIT LOW MDM: CPT

## 2024-02-22 PROCEDURE — 87880 STREP A ASSAY W/OPTIC: CPT

## 2024-02-22 PROCEDURE — 87147 CULTURE TYPE IMMUNOLOGIC: CPT

## 2024-02-22 PROCEDURE — 36415 COLL VENOUS BLD VENIPUNCTURE: CPT

## 2024-02-22 PROCEDURE — 87804 INFLUENZA ASSAY W/OPTIC: CPT

## 2024-02-22 PROCEDURE — 87070 CULTURE OTHR SPECIMN AEROBIC: CPT

## 2024-02-22 ASSESSMENT — PAIN DESCRIPTION - LOCATION: LOCATION: THROAT

## 2024-02-22 ASSESSMENT — ENCOUNTER SYMPTOMS
SORE THROAT: 1
VOMITING: 0
ABDOMINAL PAIN: 0
COLOR CHANGE: 0
SHORTNESS OF BREATH: 0
COUGH: 1
BACK PAIN: 0
DIARRHEA: 0

## 2024-02-22 ASSESSMENT — PAIN - FUNCTIONAL ASSESSMENT: PAIN_FUNCTIONAL_ASSESSMENT: 0-10

## 2024-02-22 ASSESSMENT — PAIN DESCRIPTION - DESCRIPTORS: DESCRIPTORS: SORE

## 2024-02-22 ASSESSMENT — PAIN SCALES - GENERAL: PAINLEVEL_OUTOF10: 5

## 2024-02-22 NOTE — ED PROVIDER NOTES
[facility] EMERGENCY DEPT  EMERGENCY DEPARTMENT ENCOUNTER      Pt Name: Stefany Noble  MRN: [id number]  Birthdate 1975  Date of evaluation: 2/22/2024  Provider: MOHAMUD Lopez    CHIEF COMPLAINT     No chief complaint on file.        HISTORY OF PRESENT ILLNESS   (Location/Symptom, Timing/Onset, Context/Setting, Quality, Duration, Modifying Factors, Severity)  Note limiting factors.   Stefany Noble is a 48 y.o. female with past medical history as listed below who presents to the emergency department for evaluation of sore throat, generalized bodyaches, cough with onset several days ago.  She states that she has several family numbers who are positive for strep and flu.  She denies fever, chest pain, shortness of breath, vomiting, diarrhea.  States she does have a history of asthma and has been needing to use her albuterol inhaler since she has gotten sick, still has enough of this medication at home.    Nursing Notes were reviewed.    REVIEW OF SYSTEMS    (2-9 systems for level 4, 10 or more for level 5)     Review of Systems   Constitutional:  Negative for fever.   HENT:  Positive for congestion and sore throat.    Eyes:  Negative for visual disturbance.   Respiratory:  Positive for cough. Negative for shortness of breath.    Cardiovascular:  Negative for chest pain.   Gastrointestinal:  Negative for abdominal pain, diarrhea and vomiting.   Genitourinary:  Negative for dysuria.   Musculoskeletal:  Positive for myalgias. Negative for back pain and neck pain.   Skin:  Negative for color change.   Neurological:  Negative for dizziness and headaches.   Psychiatric/Behavioral:  Negative for confusion.        Except as noted above the remainder of the review of systems was reviewed and negative.       PAST MEDICAL HISTORY     Past Medical History:   Diagnosis Date    Arthritis     Asthma     Autoimmune disease (HCC)     FIBROMYALGIA    Back pain     DDD    Chronic obstructive pulmonary disease (HCC)

## 2024-02-22 NOTE — ED TRIAGE NOTES
Patient arrives with c/o sore throat, SOB, cough, body aches x \"few days.\"    States family members currently have flu and strep.    Hx of asthma.

## 2024-02-22 NOTE — DISCHARGE INSTRUCTIONS
You were seen today for upper respiratory virus symptoms.  Your tests for flu, COVID, and strep are negative.  We do have a throat culture pending which will likely result in the next 24 hours, and if it is positive for strep you will receive a phone call.  Your symptoms appear to be due to a viral illness.  If imaging and lab work were done, these are reassuring enough to go home at this time. Viral illnesses are treated with supportive care, including increasing your fluid intake to 8-10 large glasses of water a day, over the counter fever and pain reducers, and rest. Take all other medications as prescribed and directed. If you smoke/ vape, please cut back on usage and try to stop as these can lengthen the time of your symptoms and possibly worsen them as well.     To limit the spread of your symptoms to others you should wash your hands frequently and keep surfaces in your home clean.  You condition should improve over the next 5 days with the care discussed. You should return to the ER- if you experience increased fevers that do not improve with use of Tylenol and Motrin (as directed),  increased shortness of breath, chest pain, changes in vision such as blurry vision, neck stiffness, confusion, or other worsening or worrisome concerns. You should follow up with your primary care physician this week for further evaluation. Call for an appointment today.

## 2024-02-24 LAB
BACTERIA SPEC CULT: ABNORMAL
BACTERIA SPEC CULT: ABNORMAL
SERVICE CMNT-IMP: ABNORMAL

## 2024-02-28 RX ORDER — SPIRONOLACTONE 25 MG/1
25 TABLET ORAL 2 TIMES DAILY
Qty: 60 TABLET | Refills: 1 | Status: SHIPPED | OUTPATIENT
Start: 2024-02-28

## 2024-03-29 RX ORDER — MULTIVITAMIN/IRON/FOLIC ACID 18MG-0.4MG
TABLET ORAL
Qty: 30 TABLET | Refills: 1 | Status: SHIPPED | OUTPATIENT
Start: 2024-03-29

## 2024-04-01 RX ORDER — FLUTICASONE PROPIONATE 50 MCG
SPRAY, SUSPENSION (ML) NASAL
Qty: 16 G | Refills: 1 | Status: SHIPPED | OUTPATIENT
Start: 2024-04-01

## 2024-04-09 RX ORDER — TOPIRAMATE 100 MG/1
100 TABLET, FILM COATED ORAL 2 TIMES DAILY
Qty: 60 TABLET | Refills: 1 | Status: SHIPPED | OUTPATIENT
Start: 2024-04-09

## 2024-04-16 RX ORDER — SPIRONOLACTONE 25 MG/1
25 TABLET ORAL 2 TIMES DAILY
Qty: 180 TABLET | Refills: 1 | Status: SHIPPED | OUTPATIENT
Start: 2024-04-16

## 2024-04-17 DIAGNOSIS — F51.5 NIGHTMARE DISORDER: ICD-10-CM

## 2024-04-17 RX ORDER — PRAZOSIN HYDROCHLORIDE 5 MG/1
CAPSULE ORAL
Qty: 90 CAPSULE | Refills: 0 | Status: SHIPPED | OUTPATIENT
Start: 2024-04-17

## 2024-04-23 ENCOUNTER — OFFICE VISIT (OUTPATIENT)
Dept: PRIMARY CARE CLINIC | Facility: CLINIC | Age: 49
End: 2024-04-23

## 2024-04-23 VITALS
BODY MASS INDEX: 47.09 KG/M2 | HEART RATE: 98 BPM | WEIGHT: 293 LBS | TEMPERATURE: 97.4 F | SYSTOLIC BLOOD PRESSURE: 138 MMHG | HEIGHT: 66 IN | DIASTOLIC BLOOD PRESSURE: 90 MMHG

## 2024-04-23 DIAGNOSIS — E78.2 MIXED HYPERLIPIDEMIA: Primary | ICD-10-CM

## 2024-04-23 DIAGNOSIS — L98.9 SKIN PROBLEM: ICD-10-CM

## 2024-04-23 RX ORDER — TRIAMCINOLONE ACETONIDE 1 MG/G
OINTMENT TOPICAL
Qty: 80 G | Refills: 0 | Status: SHIPPED | OUTPATIENT
Start: 2024-04-23

## 2024-04-23 ASSESSMENT — PATIENT HEALTH QUESTIONNAIRE - PHQ9
7. TROUBLE CONCENTRATING ON THINGS, SUCH AS READING THE NEWSPAPER OR WATCHING TELEVISION: NOT AT ALL
2. FEELING DOWN, DEPRESSED OR HOPELESS: MORE THAN HALF THE DAYS
3. TROUBLE FALLING OR STAYING ASLEEP: NOT AT ALL
8. MOVING OR SPEAKING SO SLOWLY THAT OTHER PEOPLE COULD HAVE NOTICED. OR THE OPPOSITE, BEING SO FIGETY OR RESTLESS THAT YOU HAVE BEEN MOVING AROUND A LOT MORE THAN USUAL: NOT AT ALL
SUM OF ALL RESPONSES TO PHQ QUESTIONS 1-9: 4
5. POOR APPETITE OR OVEREATING: NOT AT ALL
SUM OF ALL RESPONSES TO PHQ QUESTIONS 1-9: 4
SUM OF ALL RESPONSES TO PHQ QUESTIONS 1-9: 4
10. IF YOU CHECKED OFF ANY PROBLEMS, HOW DIFFICULT HAVE THESE PROBLEMS MADE IT FOR YOU TO DO YOUR WORK, TAKE CARE OF THINGS AT HOME, OR GET ALONG WITH OTHER PEOPLE: NOT DIFFICULT AT ALL
6. FEELING BAD ABOUT YOURSELF - OR THAT YOU ARE A FAILURE OR HAVE LET YOURSELF OR YOUR FAMILY DOWN: NOT AT ALL
SUM OF ALL RESPONSES TO PHQ QUESTIONS 1-9: 4
SUM OF ALL RESPONSES TO PHQ9 QUESTIONS 1 & 2: 4
1. LITTLE INTEREST OR PLEASURE IN DOING THINGS: MORE THAN HALF THE DAYS
9. THOUGHTS THAT YOU WOULD BE BETTER OFF DEAD, OR OF HURTING YOURSELF: NOT AT ALL
4. FEELING TIRED OR HAVING LITTLE ENERGY: NOT AT ALL

## 2024-04-23 NOTE — PROGRESS NOTES
\"Have you been to the ER, urgent care clinic since your last visit?  Hospitalized since your last visit?\"    NO    “Have you seen or consulted any other health care providers outside of [facility] since your last visit?”    NO    Have you had a mammogram?”   NO    Date of last Mammogram: 10/8/2020      “Have you had a pap smear?”    NO    No cervical cancer screening on file         “Have you had a colorectal cancer screening such as a colonoscopy/FIT/Cologuard?    NO    No colonoscopy on file  No cologuard on file  No FIT/FOBT on file   No flexible sigmoidoscopy on file         Click Here for Release of Records Request  
       Allergies   Allergen Reactions    Duloxetine Other (See Comments)    Penicillins Itching    Tramadol Other (See Comments)     Heart fluttering    Pregabalin Rash     Past Medical History:   Diagnosis Date    Arthritis     Asthma     Autoimmune disease ([facility])     FIBROMYALGIA    Back pain     DDD    Chronic obstructive pulmonary disease (HCC)     Chronic pain     Depression     Fibromyalgia     GERD (gastroesophageal reflux disease)     Headache     migraines    Hypertension     Morbid obesity (HCC)     Morbid obesity with body mass index (BMI) of 50.0 to 59.9 in adult ([facility]) 8/13/2020    Neuropathy     Psychiatric disorder     DEPRESSION AND ANXIETY    Sleep apnea     on CPAP     Past Surgical History:   Procedure Laterality Date    HEENT  2004    WISDOM TEETH EXTRACTION    ORTHOPEDIC SURGERY Left 2014    ACHILLES TENDON \"SURGERY\"     Family History   Problem Relation Age of Onset    Cancer Father         STOMACH    Depression Sister     Anesth Problems Neg Hx     Other Daughter         OVARIAN CYSTS    Hypertension Daughter     Asthma Mother     Hypertension Mother     Diabetes Brother     Hypertension Brother      Social History     Tobacco Use    Smoking status: Every Day     Current packs/day: 1.00     Types: Cigarettes    Smokeless tobacco: Never   Substance Use Topics    Alcohol use: No           Review of Systems   Skin:  Positive for rash.         Physical Exam  Constitutional:       General: She is not in acute distress.     Appearance: She is obese.   HENT:      Head: Normocephalic.      Right Ear: External ear normal.      Left Ear: External ear normal.   Cardiovascular:      Rate and Rhythm: Normal rate and regular rhythm.      Heart sounds: Normal heart sounds.   Pulmonary:      Effort: Pulmonary effort is normal. No respiratory distress.      Breath sounds: Normal breath sounds. No wheezing.   Skin:     Comments: Dry area between webs of fingers, white between third and fourth finger    Small bite

## 2024-04-24 LAB
ALBUMIN SERPL-MCNC: 3.9 G/DL (ref 3.9–4.9)
ALBUMIN/GLOB SERPL: 1.3 {RATIO} (ref 1.2–2.2)
ALP SERPL-CCNC: 144 IU/L (ref 44–121)
ALT SERPL-CCNC: 8 IU/L (ref 0–32)
AST SERPL-CCNC: 10 IU/L (ref 0–40)
BILIRUB SERPL-MCNC: 0.2 MG/DL (ref 0–1.2)
BUN SERPL-MCNC: 12 MG/DL (ref 6–24)
BUN/CREAT SERPL: 10 (ref 9–23)
CALCIUM SERPL-MCNC: 9 MG/DL (ref 8.7–10.2)
CHLORIDE SERPL-SCNC: 105 MMOL/L (ref 96–106)
CHOLEST SERPL-MCNC: 162 MG/DL (ref 100–199)
CO2 SERPL-SCNC: 19 MMOL/L (ref 20–29)
CREAT SERPL-MCNC: 1.22 MG/DL (ref 0.57–1)
EGFRCR SERPLBLD CKD-EPI 2021: 54 ML/MIN/1.73
ERYTHROCYTE [DISTWIDTH] IN BLOOD BY AUTOMATED COUNT: 14.9 % (ref 11.7–15.4)
GLOBULIN SER CALC-MCNC: 3 G/DL (ref 1.5–4.5)
GLUCOSE SERPL-MCNC: 137 MG/DL (ref 70–99)
HCT VFR BLD AUTO: 40.2 % (ref 34–46.6)
HDLC SERPL-MCNC: 40 MG/DL
HGB BLD-MCNC: 13.3 G/DL (ref 11.1–15.9)
LDLC SERPL CALC-MCNC: 101 MG/DL (ref 0–99)
MCH RBC QN AUTO: 27.1 PG (ref 26.6–33)
MCHC RBC AUTO-ENTMCNC: 33.1 G/DL (ref 31.5–35.7)
MCV RBC AUTO: 82 FL (ref 79–97)
PLATELET # BLD AUTO: 278 X10E3/UL (ref 150–450)
POTASSIUM SERPL-SCNC: 3.8 MMOL/L (ref 3.5–5.2)
PROT SERPL-MCNC: 6.9 G/DL (ref 6–8.5)
RBC # BLD AUTO: 4.9 X10E6/UL (ref 3.77–5.28)
SODIUM SERPL-SCNC: 141 MMOL/L (ref 134–144)
TRIGL SERPL-MCNC: 114 MG/DL (ref 0–149)
VLDLC SERPL CALC-MCNC: 21 MG/DL (ref 5–40)
WBC # BLD AUTO: 8.4 X10E3/UL (ref 3.4–10.8)

## 2024-04-25 LAB — SPECIMEN STATUS REPORT: NORMAL

## 2024-04-26 LAB — HBA1C MFR BLD: 6.3 % (ref 4.8–5.6)

## 2024-04-30 RX ORDER — FLUTICASONE PROPIONATE 50 MCG
SPRAY, SUSPENSION (ML) NASAL
Qty: 48 G | Refills: 1 | Status: SHIPPED | OUTPATIENT
Start: 2024-04-30

## 2024-05-07 ENCOUNTER — TELEPHONE (OUTPATIENT)
Dept: PRIMARY CARE CLINIC | Facility: CLINIC | Age: 49
End: 2024-05-07

## 2024-05-20 RX ORDER — FAMOTIDINE 20 MG/1
20 TABLET, FILM COATED ORAL 2 TIMES DAILY
Qty: 60 TABLET | Refills: 2 | Status: SHIPPED | OUTPATIENT
Start: 2024-05-20

## 2024-05-28 DIAGNOSIS — L98.9 SKIN PROBLEM: ICD-10-CM

## 2024-05-28 DIAGNOSIS — F51.5 NIGHTMARE DISORDER: ICD-10-CM

## 2024-05-29 RX ORDER — UREA 10 %
LOTION (ML) TOPICAL
Qty: 30 TABLET | Refills: 1 | Status: SHIPPED | OUTPATIENT
Start: 2024-05-29

## 2024-05-29 RX ORDER — TRIAMCINOLONE ACETONIDE 1 MG/G
OINTMENT TOPICAL
Qty: 80 G | Refills: 0 | Status: SHIPPED | OUTPATIENT
Start: 2024-05-29

## 2024-05-29 RX ORDER — FAMOTIDINE 20 MG/1
20 TABLET, FILM COATED ORAL 2 TIMES DAILY
Qty: 180 TABLET | Refills: 0 | Status: SHIPPED | OUTPATIENT
Start: 2024-05-29

## 2024-05-29 RX ORDER — PRAZOSIN HYDROCHLORIDE 2 MG/1
CAPSULE ORAL
Qty: 180 CAPSULE | Refills: 0 | Status: SHIPPED | OUTPATIENT
Start: 2024-05-29

## 2024-06-02 DIAGNOSIS — I10 ESSENTIAL (PRIMARY) HYPERTENSION: ICD-10-CM

## 2024-06-03 RX ORDER — CLONIDINE HYDROCHLORIDE 0.1 MG/1
0.1 TABLET ORAL 2 TIMES DAILY
Qty: 60 TABLET | Refills: 1 | Status: SHIPPED | OUTPATIENT
Start: 2024-06-03

## 2024-06-26 RX ORDER — MULTIVITAMIN/IRON/FOLIC ACID 18MG-0.4MG
TABLET ORAL
Qty: 90 TABLET | Refills: 1 | Status: SHIPPED | OUTPATIENT
Start: 2024-06-26

## 2024-07-15 DIAGNOSIS — F51.5 NIGHTMARE DISORDER: ICD-10-CM

## 2024-07-15 RX ORDER — PRAZOSIN HYDROCHLORIDE 5 MG/1
CAPSULE ORAL
Qty: 90 CAPSULE | Refills: 0 | Status: SHIPPED | OUTPATIENT
Start: 2024-07-15

## 2024-07-19 DIAGNOSIS — I10 ESSENTIAL (PRIMARY) HYPERTENSION: ICD-10-CM

## 2024-07-19 RX ORDER — CLONIDINE HYDROCHLORIDE 0.1 MG/1
0.1 TABLET ORAL 2 TIMES DAILY
Qty: 180 TABLET | Refills: 1 | Status: SHIPPED | OUTPATIENT
Start: 2024-07-19

## 2024-07-31 ENCOUNTER — COMMUNITY OUTREACH (OUTPATIENT)
Dept: PRIMARY CARE CLINIC | Facility: CLINIC | Age: 49
End: 2024-07-31

## 2024-08-28 ENCOUNTER — TELEPHONE (OUTPATIENT)
Dept: PRIMARY CARE CLINIC | Facility: CLINIC | Age: 49
End: 2024-08-28

## 2024-08-28 NOTE — TELEPHONE ENCOUNTER
----- Message from Ivan MORENO sent at 8/28/2024  4:48 PM EDT -----  Regarding: ECC Escalation To Practice  ECC Escalation To Practice      Type of Escalation: Red Flag Symptom  --------------------------------------------------------------------------------------------------------------------------    Information for Provider:  Patient is looking for appointment for: Symptom SWELLING  Reasons for Message: Unable to reach practice     Additional Information PT IS HAVING A MIDDLE FINGER RIGHT HAND IS SWELLING, ALSO UTI OR BLOOD INFECTION  --------------------------------------------------------------------------------------------------------------------------    Relationship to Patient: Self     Call Back Info: OK to leave message on voicemail  Preferred Call Back Number: Phone [phone]

## 2024-08-29 ENCOUNTER — HOSPITAL ENCOUNTER (EMERGENCY)
Facility: HOSPITAL | Age: 49
Discharge: HOME | End: 2024-08-29
Attending: STUDENT IN AN ORGANIZED HEALTH CARE EDUCATION/TRAINING PROGRAM
Payer: MEDICAID

## 2024-08-29 ENCOUNTER — APPOINTMENT (OUTPATIENT)
Facility: HOSPITAL | Age: 49
End: 2024-08-29
Payer: MEDICAID

## 2024-08-29 VITALS
BODY MASS INDEX: 47.09 KG/M2 | TEMPERATURE: 98.1 F | RESPIRATION RATE: 17 BRPM | OXYGEN SATURATION: 99 % | HEIGHT: 66 IN | SYSTOLIC BLOOD PRESSURE: 129 MMHG | HEART RATE: 59 BPM | WEIGHT: 293 LBS | DIASTOLIC BLOOD PRESSURE: 74 MMHG

## 2024-08-29 DIAGNOSIS — R35.0 URINARY FREQUENCY: ICD-10-CM

## 2024-08-29 DIAGNOSIS — M67.431 GANGLION CYST OF DORSUM OF RIGHT WRIST: ICD-10-CM

## 2024-08-29 DIAGNOSIS — M79.89 FINGER SWELLING: ICD-10-CM

## 2024-08-29 DIAGNOSIS — R10.9 RIGHT FLANK PAIN: Primary | ICD-10-CM

## 2024-08-29 LAB
ALBUMIN SERPL-MCNC: 3.4 G/DL (ref 3.5–5)
ALBUMIN/GLOB SERPL: 0.8 (ref 1.1–2.2)
ALP SERPL-CCNC: 130 U/L (ref 45–117)
ALT SERPL-CCNC: 18 U/L (ref 12–78)
ANION GAP SERPL CALC-SCNC: 4 MMOL/L (ref 5–15)
APPEARANCE UR: CLEAR
AST SERPL-CCNC: 16 U/L (ref 15–37)
BACTERIA URNS QL MICRO: NEGATIVE /HPF
BASOPHILS # BLD: 0.1 K/UL (ref 0–0.1)
BASOPHILS NFR BLD: 1 % (ref 0–1)
BILIRUB SERPL-MCNC: 0.6 MG/DL (ref 0.2–1)
BILIRUB UR QL: NEGATIVE
BUN SERPL-MCNC: 12 MG/DL (ref 6–20)
BUN/CREAT SERPL: 10 (ref 12–20)
CALCIUM SERPL-MCNC: 8.6 MG/DL (ref 8.5–10.1)
CHLORIDE SERPL-SCNC: 109 MMOL/L (ref 97–108)
CO2 SERPL-SCNC: 25 MMOL/L (ref 21–32)
COLOR UR: NORMAL
COMMENT:: NORMAL
CREAT SERPL-MCNC: 1.21 MG/DL (ref 0.55–1.02)
DIFFERENTIAL METHOD BLD: ABNORMAL
EOSINOPHIL # BLD: 0.1 K/UL (ref 0–0.4)
EOSINOPHIL NFR BLD: 1 % (ref 0–7)
EPITH CASTS URNS QL MICRO: NORMAL /LPF
ERYTHROCYTE [DISTWIDTH] IN BLOOD BY AUTOMATED COUNT: 13.8 % (ref 11.5–14.5)
GLOBULIN SER CALC-MCNC: 4.3 G/DL (ref 2–4)
GLUCOSE SERPL-MCNC: 95 MG/DL (ref 65–100)
GLUCOSE UR STRIP.AUTO-MCNC: NEGATIVE MG/DL
HCT VFR BLD AUTO: 37.8 % (ref 35–47)
HGB BLD-MCNC: 13.3 G/DL (ref 11.5–16)
HGB UR QL STRIP: NEGATIVE
HYALINE CASTS URNS QL MICRO: NORMAL /LPF (ref 0–2)
IMM GRANULOCYTES # BLD AUTO: 0 K/UL (ref 0–0.04)
IMM GRANULOCYTES NFR BLD AUTO: 0 % (ref 0–0.5)
KETONES UR QL STRIP.AUTO: NEGATIVE MG/DL
LEUKOCYTE ESTERASE UR QL STRIP.AUTO: NEGATIVE
LYMPHOCYTES # BLD: 4 K/UL (ref 0.8–3.5)
LYMPHOCYTES NFR BLD: 43 % (ref 12–49)
MCH RBC QN AUTO: 27.3 PG (ref 26–34)
MCHC RBC AUTO-ENTMCNC: 35.2 G/DL (ref 30–36.5)
MCV RBC AUTO: 77.6 FL (ref 80–99)
MONOCYTES # BLD: 0.6 K/UL (ref 0–1)
MONOCYTES NFR BLD: 6 % (ref 5–13)
NEUTS SEG # BLD: 4.6 K/UL (ref 1.8–8)
NEUTS SEG NFR BLD: 49 % (ref 32–75)
NITRITE UR QL STRIP.AUTO: NEGATIVE
NRBC # BLD: 0 K/UL (ref 0–0.01)
NRBC BLD-RTO: 0 PER 100 WBC
PH UR STRIP: 5.5 (ref 5–8)
PLATELET # BLD AUTO: 255 K/UL (ref 150–400)
PMV BLD AUTO: 11 FL (ref 8.9–12.9)
POTASSIUM SERPL-SCNC: 3.5 MMOL/L (ref 3.5–5.1)
PROT SERPL-MCNC: 7.7 G/DL (ref 6.4–8.2)
PROT UR STRIP-MCNC: NEGATIVE MG/DL
RBC # BLD AUTO: 4.87 M/UL (ref 3.8–5.2)
RBC #/AREA URNS HPF: NORMAL /HPF (ref 0–5)
SODIUM SERPL-SCNC: 138 MMOL/L (ref 136–145)
SP GR UR REFRACTOMETRY: 1.01 (ref 1–1.03)
SPECIMEN HOLD: NORMAL
SPECIMEN HOLD: NORMAL
TROPONIN I SERPL HS-MCNC: 5 NG/L (ref 0–51)
UROBILINOGEN UR QL STRIP.AUTO: 1 EU/DL (ref 0.2–1)
WBC # BLD AUTO: 9.4 K/UL (ref 3.6–11)
WBC URNS QL MICRO: NORMAL /HPF (ref 0–4)

## 2024-08-29 PROCEDURE — 80053 COMPREHEN METABOLIC PANEL: CPT

## 2024-08-29 PROCEDURE — 36415 COLL VENOUS BLD VENIPUNCTURE: CPT

## 2024-08-29 PROCEDURE — 81001 URINALYSIS AUTO W/SCOPE: CPT

## 2024-08-29 PROCEDURE — 84484 ASSAY OF TROPONIN QUANT: CPT

## 2024-08-29 PROCEDURE — 73140 X-RAY EXAM OF FINGER(S): CPT

## 2024-08-29 PROCEDURE — 85025 COMPLETE CBC W/AUTO DIFF WBC: CPT

## 2024-08-29 PROCEDURE — 99284 EMERGENCY DEPT VISIT MOD MDM: CPT

## 2024-08-29 PROCEDURE — 93005 ELECTROCARDIOGRAM TRACING: CPT | Performed by: STUDENT IN AN ORGANIZED HEALTH CARE EDUCATION/TRAINING PROGRAM

## 2024-08-29 PROCEDURE — 76770 US EXAM ABDO BACK WALL COMP: CPT

## 2024-08-29 ASSESSMENT — PAIN DESCRIPTION - DESCRIPTORS
DESCRIPTORS: ACHING
DESCRIPTORS: ACHING

## 2024-08-29 ASSESSMENT — PAIN DESCRIPTION - ORIENTATION
ORIENTATION: MID
ORIENTATION: RIGHT

## 2024-08-29 ASSESSMENT — PAIN DESCRIPTION - LOCATION
LOCATION: FLANK
LOCATION: GENERALIZED;CHEST

## 2024-08-29 ASSESSMENT — PAIN SCALES - GENERAL
PAINLEVEL_OUTOF10: 8
PAINLEVEL_OUTOF10: 8

## 2024-08-29 ASSESSMENT — PAIN - FUNCTIONAL ASSESSMENT
PAIN_FUNCTIONAL_ASSESSMENT: 0-10
PAIN_FUNCTIONAL_ASSESSMENT: 0-10

## 2024-08-29 ASSESSMENT — ENCOUNTER SYMPTOMS: BACK PAIN: 1

## 2024-08-29 NOTE — ED TRIAGE NOTES
Patient arrives to the ED for complaints of dysuria, frequency and right flank pain for 4-5 days. Reports she feels like she doesn't fully empty her bladder.   Also reports right middle finger swelling and decreased sensation with  a \"knot\" on her right wrist for several months.     Patient is a smoker.     PMH HTN, Fibromyalgia, COPD

## 2024-08-29 NOTE — ED PROVIDER NOTES
Hypertension     Morbid obesity (HCC)     Morbid obesity with body mass index (BMI) of 50.0 to 59.9 in adult ([facility]) 8/13/2020    Neuropathy     Psychiatric disorder     DEPRESSION AND ANXIETY    Sleep apnea     on CPAP         SURGICAL HISTORY       Past Surgical History:   Procedure Laterality Date    HEENT  2004    WISDOM TEETH EXTRACTION    ORTHOPEDIC SURGERY Left 2014    ACHILLES TENDON \"SURGERY\"         CURRENT MEDICATIONS       Discharge Medication List as of 8/29/2024 11:20 AM        CONTINUE these medications which have NOT CHANGED    Details   cloNIDine (CATAPRES) 0.1 MG tablet TAKE 1 TABLET BY MOUTH TWICE A DAY, Disp-180 tablet, R-1Normal      !! prazosin (MINIPRESS) 5 MG capsule TAKE 1 CAPSULE BY MOUTH EVERY NIGHT, Disp-90 capsule, R-0Normal      [facility] MELATONIN 5 MG TABS tablet TAKE 1 TABLET BY MOUTH AT BEDTIME AS NEEDED FOR INSOMNIA. DO NOT COMBINE WITH BUTALBITAL., Disp-90 tablet, R-1, DAWNormal      triamcinolone (KENALOG) 0.1 % ointment APPLY TOPICALLY 2 TIMES DAILY TO AFFECTED AFTER. DO NOT USE FOR >2 WEEKS CONSECUTIVELY., Disp-80 g, R-0, Normal      famotidine (PEPCID) 20 MG tablet TAKE 1 TABLET BY MOUTH TWICE A DAY, Disp-180 tablet, R-0Normal      !! prazosin (MINIPRESS) 2 MG capsule TAKE 1 CAPSULE BY MOUTH IN THE MORNING AND ONE AT 5PM, Disp-180 capsule, R-0Normal      fluticasone (FLONASE) 50 MCG/ACT nasal spray INSTILL 2 SPRAYS IN EACH NOSTRIL BY INTRANASAL ROUTE ONCE DAILY AS NEEDED, Disp-48 g, R-1Normal      spironolactone (ALDACTONE) 25 MG tablet TAKE 1 TABLET BY MOUTH TWICE A DAY, Disp-180 tablet, R-1Normal      topiramate (TOPAMAX) 100 MG tablet TAKE 1 TABLET BY MOUTH TWICE A DAY, Disp-60 tablet, R-1Normal      FLUoxetine (PROZAC) 40 MG capsule Take 1 capsule by mouth daily, Disp-30 capsule, R-2Normal      albuterol sulfate HFA (PROVENTIL;VENTOLIN;PROAIR) 108 (90 Base) MCG/ACT inhaler INHALE 2 PUFFS EVERY 4 TO 6 HOURS AS NEEDEDHistorical Med      butalbital-acetaminophen-caffeine (FIORICET,  mis-transcribed.)    Vasiliy Burnett DO (electronically signed)  Emergency Attending Physician              Vasiliy Burnett DO  08/29/24 [id number]

## 2024-08-30 LAB
EKG ATRIAL RATE: 68 BPM
EKG DIAGNOSIS: NORMAL
EKG P AXIS: 28 DEGREES
EKG P-R INTERVAL: 160 MS
EKG Q-T INTERVAL: 398 MS
EKG QRS DURATION: 78 MS
EKG QTC CALCULATION (BAZETT): 423 MS
EKG R AXIS: 49 DEGREES
EKG T AXIS: -23 DEGREES
EKG VENTRICULAR RATE: 68 BPM

## 2024-08-30 PROCEDURE — 93010 ELECTROCARDIOGRAM REPORT: CPT | Performed by: INTERNAL MEDICINE

## 2024-09-13 RX ORDER — FAMOTIDINE 20 MG/1
20 TABLET, FILM COATED ORAL 2 TIMES DAILY
Qty: 180 TABLET | Refills: 0 | Status: SHIPPED | OUTPATIENT
Start: 2024-09-13

## 2024-09-14 DIAGNOSIS — F51.5 NIGHTMARE DISORDER: ICD-10-CM

## 2024-09-16 RX ORDER — PRAZOSIN HYDROCHLORIDE 2 MG/1
CAPSULE ORAL
Qty: 60 CAPSULE | Refills: 2 | Status: SHIPPED | OUTPATIENT
Start: 2024-09-16

## 2024-09-20 DIAGNOSIS — F32.9 MAJOR DEPRESSIVE DISORDER WITH SINGLE EPISODE, REMISSION STATUS UNSPECIFIED: ICD-10-CM

## 2024-09-20 RX ORDER — FLUOXETINE 40 MG/1
40 CAPSULE ORAL DAILY
Qty: 30 CAPSULE | Refills: 2 | Status: SHIPPED | OUTPATIENT
Start: 2024-09-20

## 2024-10-01 DIAGNOSIS — F51.5 NIGHTMARE DISORDER: ICD-10-CM

## 2024-10-01 RX ORDER — PRAZOSIN HYDROCHLORIDE 5 MG/1
CAPSULE ORAL
Qty: 90 CAPSULE | Refills: 0 | Status: SHIPPED | OUTPATIENT
Start: 2024-10-01

## 2024-11-15 ENCOUNTER — OFFICE VISIT (OUTPATIENT)
Age: 49
End: 2024-11-15

## 2024-11-15 VITALS
HEIGHT: 66 IN | BODY MASS INDEX: 47.09 KG/M2 | WEIGHT: 293 LBS | SYSTOLIC BLOOD PRESSURE: 140 MMHG | HEART RATE: 77 BPM | OXYGEN SATURATION: 100 % | DIASTOLIC BLOOD PRESSURE: 98 MMHG

## 2024-11-15 DIAGNOSIS — Z12.4 CERVICAL CANCER SCREENING: ICD-10-CM

## 2024-11-15 DIAGNOSIS — Z01.419 ENCOUNTER FOR WELL WOMAN EXAM: Primary | ICD-10-CM

## 2024-11-15 NOTE — PROGRESS NOTES
Stefany Noble is a 49 y.o. female returns for an annual exam     No chief complaint on file.      No LMP recorded.  Her periods are moderate in flow and often irregular with no apparent pattern.  She does not have dysmenorrhea.  Problems: problems -   Birth Control: none.  Last Pap: no record  She does not have a history of MONICA 2, 3 or cervical cancer.   Last Mammogram: not yet  Last colonoscopy: not yet        Examination chaperoned by Shama Peterson MA.  
Pulse 77   Ht 1.676 m (5' 6\")   Wt (!) 160.1 kg (353 lb)   LMP 09/15/2024 (Approximate)   SpO2 100%   BMI 56.98 kg/m²     Constitutional  Appearance: well-nourished, well developed, alert, in no acute distress    HENT  Head and Face: appears normal    Neck  Inspection/Palpation: normal appearance, no masses or tenderness  Lymph Nodes: no lymphadenopathy present  Thyroid: gland size normal, nontender, no nodules or masses present on palpation    Chest  Respiratory Effort: breathing unlabored  Auscultation: normal breath sounds    Cardiovascular  Heart:  Auscultation: regular rate and rhythm without murmur    Breasts  Inspection of Breasts: breasts symmetrical, no skin changes, no discharge present, nipple appearance normal, no skin retraction present  Palpation of Breasts and Axillae: no masses present on palpation, no breast tenderness  Axillary Lymph Nodes: no lymphadenopathy present    Gastrointestinal  Abdominal Examination: abdomen non-tender to palpation, normal bowel sounds, no masses present  Hernias: no hernias identified    Genitourinary  External Genitalia: ?flattened wart-like mass on right mons near inguinal fold, no discharge present, no tenderness present, no inflammatory lesions present, no masses present, no atrophy present  Vagina: normal vaginal vault without central or paravaginal defects, no discharge present, no inflammatory lesions present, no masses present  Bladder: non-tender to palpation  Urethra: appears normal  Cervix: normal   Uterus: difficult to assess due to habitus  Adnexa: no adnexal tenderness present  Perineum: perineum within normal limits, no evidence of trauma, no rashes or skin lesions present  Anus: anus within normal limits, no hemorrhoids present  Inguinal Lymph Nodes: no lymphadenopathy present    Skin  General Inspection: no rash, no lesions identified    Neurologic/Psychiatric  Mental Status:  Orientation: grossly oriented to person, place and time  Mood and Affect:

## 2024-11-16 LAB
ESTRADIOL SERPL-MCNC: 19.2 PG/ML
FSH SERPL-ACNC: 13.4 MIU/ML
LH SERPL-ACNC: 7.1 MIU/ML
PROLACTIN SERPL-MCNC: 6 NG/ML
TSH SERPL DL<=0.05 MIU/L-ACNC: 2 UIU/ML (ref 0.36–3.74)

## 2024-11-21 LAB
CYTOLOGIST CVX/VAG CYTO: NORMAL
CYTOLOGY CVX/VAG DOC CYTO: NORMAL
CYTOLOGY CVX/VAG DOC THIN PREP: NORMAL
DX ICD CODE: NORMAL
HPV GENOTYPE REFLEX: NORMAL
HPV I/H RISK 4 DNA CVX QL PROBE+SIG AMP: NEGATIVE
Lab: NORMAL
Lab: NORMAL
OTHER STN SPEC: NORMAL
STAT OF ADQ CVX/VAG CYTO-IMP: NORMAL

## 2024-11-29 RX ORDER — SPIRONOLACTONE 25 MG/1
25 TABLET ORAL 2 TIMES DAILY
Qty: 180 TABLET | Refills: 1 | Status: SHIPPED | OUTPATIENT
Start: 2024-11-29

## 2024-11-30 DIAGNOSIS — F51.5 NIGHTMARE DISORDER: ICD-10-CM

## 2024-12-02 RX ORDER — PRAZOSIN HYDROCHLORIDE 2 MG/1
CAPSULE ORAL
Qty: 60 CAPSULE | Refills: 2 | Status: SHIPPED | OUTPATIENT
Start: 2024-12-02

## 2024-12-05 ENCOUNTER — HOSPITAL ENCOUNTER (OUTPATIENT)
Facility: HOSPITAL | Age: 49
Discharge: HOME | End: 2024-12-08
Payer: MEDICAID

## 2024-12-05 DIAGNOSIS — M54.12 CERVICAL RADICULOPATHY: ICD-10-CM

## 2024-12-05 PROCEDURE — 72141 MRI NECK SPINE W/O DYE: CPT

## 2024-12-13 DIAGNOSIS — N95.0 POSTMENOPAUSAL BLEEDING: Primary | ICD-10-CM

## 2024-12-14 DIAGNOSIS — F32.9 MAJOR DEPRESSIVE DISORDER WITH SINGLE EPISODE, REMISSION STATUS UNSPECIFIED: ICD-10-CM

## 2024-12-16 RX ORDER — FLUOXETINE 40 MG/1
CAPSULE ORAL DAILY
Qty: 30 CAPSULE | Refills: 0 | Status: SHIPPED | OUTPATIENT
Start: 2024-12-16

## 2025-01-09 DIAGNOSIS — I10 ESSENTIAL (PRIMARY) HYPERTENSION: ICD-10-CM

## 2025-01-09 RX ORDER — CLONIDINE HYDROCHLORIDE 0.1 MG/1
0.1 TABLET ORAL 2 TIMES DAILY
Qty: 180 TABLET | Refills: 1 | OUTPATIENT
Start: 2025-01-09

## 2025-01-22 RX ORDER — FAMOTIDINE 20 MG/1
20 TABLET, FILM COATED ORAL 2 TIMES DAILY
Qty: 180 TABLET | Refills: 0 | Status: SHIPPED | OUTPATIENT
Start: 2025-01-22

## 2025-01-23 ENCOUNTER — PATIENT MESSAGE (OUTPATIENT)
Dept: PRIMARY CARE CLINIC | Facility: CLINIC | Age: 50
End: 2025-01-23

## 2025-01-23 DIAGNOSIS — I10 ESSENTIAL (PRIMARY) HYPERTENSION: ICD-10-CM

## 2025-01-23 RX ORDER — FAMOTIDINE 20 MG/1
20 TABLET, FILM COATED ORAL 2 TIMES DAILY
Qty: 180 TABLET | Refills: 0 | OUTPATIENT
Start: 2025-01-23

## 2025-01-27 RX ORDER — CLONIDINE HYDROCHLORIDE 0.1 MG/1
0.1 TABLET ORAL 2 TIMES DAILY
Qty: 180 TABLET | Refills: 1 | Status: SHIPPED | OUTPATIENT
Start: 2025-01-27

## 2025-01-29 NOTE — PROGRESS NOTES
Stefany Noble is a 49 y.o. female presents for a problem visit.    Chief Complaint   Patient presents with    Follow-up     Patient's last menstrual period was 12/15/2024 (approximate).  Birth Control: none.  Last Pap: see report obtained 11/15/2024    The patient is reporting having: dark color blood spotting (looks like old blood)    1. Have you been to the ER, urgent care clinic, or hospitalized since your last visit? No    2. Have you seen or consulted any other health care providers outside of the [facility] since your last visit? No    Examination chaperoned by Shama Peterson MA.

## 2025-01-30 ENCOUNTER — OFFICE VISIT (OUTPATIENT)
Age: 50
End: 2025-01-30
Payer: MEDICAID

## 2025-01-30 VITALS — HEART RATE: 97 BPM | DIASTOLIC BLOOD PRESSURE: 99 MMHG | SYSTOLIC BLOOD PRESSURE: 177 MMHG

## 2025-01-30 DIAGNOSIS — N91.1 SECONDARY AMENORRHEA: Primary | ICD-10-CM

## 2025-01-30 PROCEDURE — 99214 OFFICE O/P EST MOD 30 MIN: CPT | Performed by: STUDENT IN AN ORGANIZED HEALTH CARE EDUCATION/TRAINING PROGRAM

## 2025-01-30 PROCEDURE — 3077F SYST BP >= 140 MM HG: CPT | Performed by: STUDENT IN AN ORGANIZED HEALTH CARE EDUCATION/TRAINING PROGRAM

## 2025-01-30 PROCEDURE — 3080F DIAST BP >= 90 MM HG: CPT | Performed by: STUDENT IN AN ORGANIZED HEALTH CARE EDUCATION/TRAINING PROGRAM

## 2025-01-30 RX ORDER — MEDROXYPROGESTERONE ACETATE 10 MG
10 TABLET ORAL 2 TIMES DAILY
Qty: 28 TABLET | Refills: 0 | Status: SHIPPED | OUTPATIENT
Start: 2025-01-30 | End: 2025-02-13

## 2025-01-30 NOTE — PROGRESS NOTES
OB/GYN Problem VIsit    HPI  Stefany Noble is a ,  49 y.o. female who presents for a problem visit.     Here to follow up. Continues to struggle with secondary amenorrhea/oligomenorrhea likely anovulatory in the setting of obesity. Had some bleeding 12/15/24, but was light.   Blood work performed in the fall suggest that she is not menopausal.  US performed 12/15 showed EMS 6mm with ?blood flow. Several intramural fibroids, largest 3cm. Patient denies pelvic pain.   Also concerned about genital wart on right mons.     Past Medical History:   Diagnosis Date    Arthritis     Asthma     Autoimmune disease (HCC)     FIBROMYALGIA    Back pain     DDD    Chronic obstructive pulmonary disease (HCC)     Chronic pain     Depression     Fibromyalgia     GERD (gastroesophageal reflux disease)     Headache     migraines    Hypertension     Morbid obesity     Morbid obesity with body mass index (BMI) of 50.0 to 59.9 in adult 2020    Neuropathy     Psychiatric disorder     DEPRESSION AND ANXIETY    Sleep apnea     on CPAP     Past Surgical History:   Procedure Laterality Date    HEENT      WISDOM TEETH EXTRACTION    ORTHOPEDIC SURGERY Left 2014    ACH[facility]ES TENDON \"SURGERY\"     Social History     Occupational History    Not on file   Tobacco Use    Smoking status: Every Day     Current packs/day: 1.00     Types: Cigarettes    Smokeless tobacco: Never   Substance and Sexual Activity    Alcohol use: No    Drug use: No    Sexual activity: Not Currently     Partners: Female     Family History   Problem Relation Age of Onset    Cancer Father         STOMACH    Depression Sister     Anesth Problems Neg Hx     Other Daughter         OVARIAN CYSTS    Hypertension Daughter     Asthma Mother     Hypertension Mother     Diabetes Brother     Hypertension Brother        Allergies   Allergen Reactions    Duloxetine Other (See Comments)    Penicillins Itching    Tramadol Other (See Comments)     Heart fluttering    Pregabalin

## 2025-01-31 ENCOUNTER — TELEMEDICINE (OUTPATIENT)
Dept: PRIMARY CARE CLINIC | Facility: CLINIC | Age: 50
End: 2025-01-31
Payer: MEDICAID

## 2025-01-31 DIAGNOSIS — G44.229 CHRONIC TENSION-TYPE HEADACHE, NOT INTRACTABLE: ICD-10-CM

## 2025-01-31 DIAGNOSIS — F32.9 MAJOR DEPRESSIVE DISORDER WITH SINGLE EPISODE, REMISSION STATUS UNSPECIFIED: ICD-10-CM

## 2025-01-31 DIAGNOSIS — F51.5 NIGHTMARE DISORDER: ICD-10-CM

## 2025-01-31 DIAGNOSIS — I10 ESSENTIAL (PRIMARY) HYPERTENSION: Primary | ICD-10-CM

## 2025-01-31 PROCEDURE — 99214 OFFICE O/P EST MOD 30 MIN: CPT | Performed by: NURSE PRACTITIONER

## 2025-01-31 RX ORDER — TOPIRAMATE 100 MG/1
100 TABLET, FILM COATED ORAL 2 TIMES DAILY
Qty: 60 TABLET | Refills: 1 | Status: SHIPPED | OUTPATIENT
Start: 2025-01-31

## 2025-01-31 SDOH — ECONOMIC STABILITY: FOOD INSECURITY: WITHIN THE PAST 12 MONTHS, YOU WORRIED THAT YOUR FOOD WOULD RUN OUT BEFORE YOU GOT MONEY TO BUY MORE.: NEVER TRUE

## 2025-01-31 SDOH — ECONOMIC STABILITY: FOOD INSECURITY: WITHIN THE PAST 12 MONTHS, THE FOOD YOU BOUGHT JUST DIDN'T LAST AND YOU DIDN'T HAVE MONEY TO GET MORE.: NEVER TRUE

## 2025-01-31 ASSESSMENT — PATIENT HEALTH QUESTIONNAIRE - PHQ9
SUM OF ALL RESPONSES TO PHQ9 QUESTIONS 1 & 2: 4
SUM OF ALL RESPONSES TO PHQ QUESTIONS 1-9: 6
5. POOR APPETITE OR OVEREATING: NOT AT ALL
SUM OF ALL RESPONSES TO PHQ QUESTIONS 1-9: 6
2. FEELING DOWN, DEPRESSED OR HOPELESS: MORE THAN HALF THE DAYS
7. TROUBLE CONCENTRATING ON THINGS, SUCH AS READING THE NEWSPAPER OR WATCHING TELEVISION: NOT AT ALL
8. MOVING OR SPEAKING SO SLOWLY THAT OTHER PEOPLE COULD HAVE NOTICED. OR THE OPPOSITE, BEING SO FIGETY OR RESTLESS THAT YOU HAVE BEEN MOVING AROUND A LOT MORE THAN USUAL: NOT AT ALL
9. THOUGHTS THAT YOU WOULD BE BETTER OFF DEAD, OR OF HURTING YOURSELF: NOT AT ALL
SUM OF ALL RESPONSES TO PHQ QUESTIONS 1-9: 6
10. IF YOU CHECKED OFF ANY PROBLEMS, HOW DIFFICULT HAVE THESE PROBLEMS MADE IT FOR YOU TO DO YOUR WORK, TAKE CARE OF THINGS AT HOME, OR GET ALONG WITH OTHER PEOPLE: NOT DIFFICULT AT ALL
6. FEELING BAD ABOUT YOURSELF - OR THAT YOU ARE A FAILURE OR HAVE LET YOURSELF OR YOUR FAMILY DOWN: NOT AT ALL
1. LITTLE INTEREST OR PLEASURE IN DOING THINGS: MORE THAN HALF THE DAYS
4. FEELING TIRED OR HAVING LITTLE ENERGY: SEVERAL DAYS
SUM OF ALL RESPONSES TO PHQ QUESTIONS 1-9: 6
3. TROUBLE FALLING OR STAYING ASLEEP: SEVERAL DAYS

## 2025-01-31 NOTE — PROGRESS NOTES
Stefany Noble is a 49 y.o. female who was seen via telemedicine today 1/31/2025).    Assessment & Plan:   Below is the assessment and plan developed based on review of pertinent history, physical exam, labs, studies, and medications.    1. Essential (primary) hypertension  Comments:  BP controlled per patient by home readings  2. Major depressive disorder with single episode, remission status unspecified  Comments:  stable on prozac.  3. Nightmare disorder  Comments:  stable on prozasin  4. Chronic tension-type headache, not intractable  Comments:  Feels she is having more migraines recently. She has not been taking the topamax because she isn't sure what it is for. She will restart this and if migraines persist despite being on the medication, she will reach out.  Orders:  -     topiramate (TOPAMAX) 100 MG tablet; Take 1 tablet by mouth 2 times daily, Disp-60 tablet, R-1Normal      No follow-ups on file.    Subjective:   Stefany was seen today for Medication Refill (Needs med refills and would like for arm to be checked. Arm is peeling and dark.)     Nightmare disorder: patient was seeing psychiatrist but has not appointment in a while. She reports these are stable on prozasin. She still has some but reports they are less frequent.    Depression: she reports her depression is stable on prozac. She has not seen her psychiatrist. She does see therapist. She denies SI/HI    HTN: patient reports her blood pressure is good as long as she takes her medications. She has been consistent with her medications.     GERD: patient reports pepcid controls her symptoms. If she doesn't take it, her reflux flares up.     Migraines: patient reports she does still have migraines. Feels like a throbbing pain. Feels like they are happening more often. She did stop taking her topamax so that may be why.    Review of Systems   Constitutional:  Negative for fatigue.   Cardiovascular:  Negative for chest pain and palpitations.

## 2025-01-31 NOTE — PROGRESS NOTES
\"Have you been to the ER, urgent care clinic since your last visit?  Hospitalized since your last visit?\"    NO    “Have you seen or consulted any other health care providers outside our system since your last visit?”    NO    Have you had a mammogram?”   NO    Date of last Mammogram: 10/8/2020       “Have you had a colorectal cancer screening such as a colonoscopy/FIT/Cologuard?    NO    No colonoscopy on file  No cologuard on file  No FIT/FOBT on file   No flexible sigmoidoscopy on file

## 2025-02-02 DIAGNOSIS — F51.5 NIGHTMARE DISORDER: ICD-10-CM

## 2025-02-03 RX ORDER — PRAZOSIN HYDROCHLORIDE 2 MG/1
CAPSULE ORAL
Qty: 60 CAPSULE | Refills: 2 | Status: SHIPPED | OUTPATIENT
Start: 2025-02-03

## 2025-02-09 DIAGNOSIS — F51.5 NIGHTMARE DISORDER: ICD-10-CM

## 2025-02-09 RX ORDER — PRAZOSIN HYDROCHLORIDE 5 MG/1
CAPSULE ORAL
Qty: 90 CAPSULE | Refills: 0 | Status: SHIPPED | OUTPATIENT
Start: 2025-02-09

## 2025-02-12 DIAGNOSIS — F32.9 MAJOR DEPRESSIVE DISORDER WITH SINGLE EPISODE, REMISSION STATUS UNSPECIFIED: ICD-10-CM

## 2025-02-12 RX ORDER — FLUOXETINE 40 MG/1
CAPSULE ORAL DAILY
Qty: 30 CAPSULE | Refills: 0 | Status: SHIPPED | OUTPATIENT
Start: 2025-02-12

## 2025-02-13 DIAGNOSIS — N91.1 SECONDARY AMENORRHEA: Primary | ICD-10-CM

## 2025-03-06 DIAGNOSIS — N91.1 SECONDARY AMENORRHEA: Primary | ICD-10-CM

## 2025-03-08 DIAGNOSIS — F32.9 MAJOR DEPRESSIVE DISORDER WITH SINGLE EPISODE, REMISSION STATUS UNSPECIFIED: ICD-10-CM

## 2025-03-10 RX ORDER — FLUOXETINE HYDROCHLORIDE 40 MG/1
CAPSULE ORAL DAILY
Qty: 90 CAPSULE | Refills: 1 | Status: SHIPPED | OUTPATIENT
Start: 2025-03-10

## 2025-03-16 ENCOUNTER — TRANSCRIBE ORDERS (OUTPATIENT)
Facility: HOSPITAL | Age: 50
End: 2025-03-16

## 2025-03-16 DIAGNOSIS — M54.16 LUMBAR RADICULOPATHY: Primary | ICD-10-CM

## 2025-03-28 ENCOUNTER — HOSPITAL ENCOUNTER (OUTPATIENT)
Facility: HOSPITAL | Age: 50
Discharge: HOME | End: 2025-03-31
Attending: ANESTHESIOLOGY
Payer: MEDICAID

## 2025-03-28 DIAGNOSIS — M54.16 LUMBAR RADICULOPATHY: ICD-10-CM

## 2025-03-28 PROCEDURE — 72148 MRI LUMBAR SPINE W/O DYE: CPT

## 2025-04-16 ENCOUNTER — OFFICE VISIT (OUTPATIENT)
Dept: PRIMARY CARE CLINIC | Facility: CLINIC | Age: 50
End: 2025-04-16
Payer: MEDICAID

## 2025-04-16 VITALS
HEART RATE: 78 BPM | SYSTOLIC BLOOD PRESSURE: 142 MMHG | TEMPERATURE: 98.4 F | DIASTOLIC BLOOD PRESSURE: 88 MMHG | OXYGEN SATURATION: 97 % | WEIGHT: 293 LBS | BODY MASS INDEX: 47.09 KG/M2 | RESPIRATION RATE: 16 BRPM | HEIGHT: 66 IN

## 2025-04-16 DIAGNOSIS — F32.9 MAJOR DEPRESSIVE DISORDER WITH SINGLE EPISODE, REMISSION STATUS UNSPECIFIED: ICD-10-CM

## 2025-04-16 DIAGNOSIS — G44.229 CHRONIC TENSION-TYPE HEADACHE, NOT INTRACTABLE: ICD-10-CM

## 2025-04-16 DIAGNOSIS — R53.1 WEAKNESS: ICD-10-CM

## 2025-04-16 DIAGNOSIS — E66.01 MORBID OBESITY WITH BODY MASS INDEX (BMI) OF 50.0 TO 59.9 IN ADULT: ICD-10-CM

## 2025-04-16 DIAGNOSIS — M25.531 RIGHT WRIST PAIN: ICD-10-CM

## 2025-04-16 DIAGNOSIS — F51.01 PRIMARY INSOMNIA: ICD-10-CM

## 2025-04-16 DIAGNOSIS — R73.03 PRE-DIABETES: ICD-10-CM

## 2025-04-16 DIAGNOSIS — I10 ESSENTIAL (PRIMARY) HYPERTENSION: Primary | ICD-10-CM

## 2025-04-16 DIAGNOSIS — R53.83 FATIGUE, UNSPECIFIED TYPE: ICD-10-CM

## 2025-04-16 DIAGNOSIS — E78.2 MIXED HYPERLIPIDEMIA: ICD-10-CM

## 2025-04-16 DIAGNOSIS — L02.92 BOIL: ICD-10-CM

## 2025-04-16 DIAGNOSIS — K21.9 GASTROESOPHAGEAL REFLUX DISEASE WITHOUT ESOPHAGITIS: ICD-10-CM

## 2025-04-16 DIAGNOSIS — R35.0 URINARY FREQUENCY: ICD-10-CM

## 2025-04-16 DIAGNOSIS — F51.5 NIGHTMARE DISORDER: ICD-10-CM

## 2025-04-16 LAB
BILIRUBIN, URINE, POC: NEGATIVE
BLOOD URINE, POC: NORMAL
GLUCOSE URINE, POC: NEGATIVE
KETONES, URINE, POC: NEGATIVE
LEUKOCYTE ESTERASE, URINE, POC: NEGATIVE
NITRITE, URINE, POC: NEGATIVE
PH, URINE, POC: 6 (ref 4.6–8)
PROTEIN,URINE, POC: NEGATIVE
SPECIFIC GRAVITY, URINE, POC: 1.02 (ref 1–1.03)
URINALYSIS CLARITY, POC: CLEAR
URINALYSIS COLOR, POC: YELLOW
UROBILINOGEN, POC: NORMAL MG/DL

## 2025-04-16 PROCEDURE — 3079F DIAST BP 80-89 MM HG: CPT | Performed by: NURSE PRACTITIONER

## 2025-04-16 PROCEDURE — 3077F SYST BP >= 140 MM HG: CPT | Performed by: NURSE PRACTITIONER

## 2025-04-16 PROCEDURE — 99215 OFFICE O/P EST HI 40 MIN: CPT | Performed by: NURSE PRACTITIONER

## 2025-04-16 PROCEDURE — 81001 URINALYSIS AUTO W/SCOPE: CPT | Performed by: NURSE PRACTITIONER

## 2025-04-16 RX ORDER — FUROSEMIDE 20 MG/1
20 TABLET ORAL 2 TIMES DAILY
Qty: 60 TABLET | Refills: 1 | Status: SHIPPED | OUTPATIENT
Start: 2025-04-16

## 2025-04-16 RX ORDER — TOPIRAMATE 100 MG/1
100 TABLET, FILM COATED ORAL 2 TIMES DAILY
Qty: 60 TABLET | Refills: 1 | Status: SHIPPED | OUTPATIENT
Start: 2025-04-16

## 2025-04-16 RX ORDER — SPIRONOLACTONE 25 MG/1
25 TABLET ORAL 2 TIMES DAILY
Qty: 180 TABLET | Refills: 1 | Status: SHIPPED | OUTPATIENT
Start: 2025-04-16

## 2025-04-16 ASSESSMENT — PATIENT HEALTH QUESTIONNAIRE - PHQ9
4. FEELING TIRED OR HAVING LITTLE ENERGY: SEVERAL DAYS
5. POOR APPETITE OR OVEREATING: SEVERAL DAYS
2. FEELING DOWN, DEPRESSED OR HOPELESS: NOT AT ALL
SUM OF ALL RESPONSES TO PHQ QUESTIONS 1-9: 4
SUM OF ALL RESPONSES TO PHQ QUESTIONS 1-9: 4
7. TROUBLE CONCENTRATING ON THINGS, SUCH AS READING THE NEWSPAPER OR WATCHING TELEVISION: SEVERAL DAYS
3. TROUBLE FALLING OR STAYING ASLEEP: SEVERAL DAYS
1. LITTLE INTEREST OR PLEASURE IN DOING THINGS: NOT AT ALL
SUM OF ALL RESPONSES TO PHQ QUESTIONS 1-9: 4
9. THOUGHTS THAT YOU WOULD BE BETTER OFF DEAD, OR OF HURTING YOURSELF: NOT AT ALL
SUM OF ALL RESPONSES TO PHQ QUESTIONS 1-9: 4
6. FEELING BAD ABOUT YOURSELF - OR THAT YOU ARE A FAILURE OR HAVE LET YOURSELF OR YOUR FAMILY DOWN: NOT AT ALL
10. IF YOU CHECKED OFF ANY PROBLEMS, HOW DIFFICULT HAVE THESE PROBLEMS MADE IT FOR YOU TO DO YOUR WORK, TAKE CARE OF THINGS AT HOME, OR GET ALONG WITH OTHER PEOPLE: NOT DIFFICULT AT ALL
8. MOVING OR SPEAKING SO SLOWLY THAT OTHER PEOPLE COULD HAVE NOTICED. OR THE OPPOSITE, BEING SO FIGETY OR RESTLESS THAT YOU HAVE BEEN MOVING AROUND A LOT MORE THAN USUAL: NOT AT ALL

## 2025-04-16 ASSESSMENT — ENCOUNTER SYMPTOMS
SHORTNESS OF BREATH: 0
ABDOMINAL PAIN: 0

## 2025-04-16 NOTE — PROGRESS NOTES
\"Have you been to the ER, urgent care clinic since your last visit?  Hospitalized since your last visit?\"    NO    “Have you seen or consulted any other health care providers outside our system since your last visit?”    NO    Have you had a mammogram?”   NO    Date of last Mammogram: 10/8/2020       “Have you had a colorectal cancer screening such as a colonoscopy/FIT/Cologuard?    NO    No colonoscopy on file  No cologuard on file  No FIT/FOBT on file   No flexible sigmoidoscopy on file          
   Bilateral carpal tunnel syndrome 08/13/2020    Degeneration of lumbar intervertebral disc 08/13/2020    Morbid obesity with body mass index (BMI) of 50.0 to 59.9 in adult 08/13/2020    Impingement syndrome of right shoulder 06/25/2019        Allergies   Allergen Reactions    Duloxetine Other (See Comments)    Penicillins Itching    Tramadol Other (See Comments)     Heart fluttering    Pregabalin Rash     Past Medical History:   Diagnosis Date    Arthritis     Asthma     Autoimmune disease     FIBROMYALGIA    Back pain     DDD    Chronic obstructive pulmonary disease (HCC)     Chronic pain     Depression     Fibromyalgia     GERD (gastroesophageal reflux disease)     Headache     migraines    Hypertension     Morbid obesity     Morbid obesity with body mass index (BMI) of 50.0 to 59.9 in adult 8/13/2020    Neuropathy     Psychiatric disorder     DEPRESSION AND ANXIETY    Sleep apnea     on CPAP     Past Surgical History:   Procedure Laterality Date    HEENT  2004    WISDOM TEETH EXTRACTION    ORTHOPEDIC SURGERY Left 2014    ACHILLES TENDON \"SURGERY\"     Family History   Problem Relation Age of Onset    Cancer Father         STOMACH    Depression Sister     Anesth Problems Neg Hx     Other Daughter         OVARIAN CYSTS    Hypertension Daughter     Asthma Mother     Hypertension Mother     Diabetes Brother     Hypertension Brother      Social History     Tobacco Use    Smoking status: Every Day     Current packs/day: 1.00     Types: Cigarettes    Smokeless tobacco: Never   Substance Use Topics    Alcohol use: No           Review of Systems   Constitutional:  Positive for fatigue.   Respiratory:  Negative for shortness of breath.    Cardiovascular:  Negative for chest pain.   Gastrointestinal:  Negative for abdominal pain.   Genitourinary:  Positive for frequency.   Musculoskeletal:  Positive for arthralgias.   Skin:  Positive for wound.   Neurological:  Positive for headaches.   Psychiatric/Behavioral:  Positive

## 2025-04-17 LAB
25(OH)D3+25(OH)D2 SERPL-MCNC: 10.1 NG/ML (ref 30–100)
ALBUMIN SERPL-MCNC: 4 G/DL (ref 3.8–4.9)
ALP SERPL-CCNC: 141 IU/L (ref 44–121)
ALT SERPL-CCNC: 12 IU/L (ref 0–32)
AST SERPL-CCNC: 15 IU/L (ref 0–40)
BILIRUB SERPL-MCNC: 0.5 MG/DL (ref 0–1.2)
BUN SERPL-MCNC: 12 MG/DL (ref 6–24)
BUN/CREAT SERPL: 11 (ref 9–23)
CALCIUM SERPL-MCNC: 9.2 MG/DL (ref 8.7–10.2)
CHLORIDE SERPL-SCNC: 100 MMOL/L (ref 96–106)
CHOLEST SERPL-MCNC: 186 MG/DL (ref 100–199)
CO2 SERPL-SCNC: 25 MMOL/L (ref 20–29)
CREAT SERPL-MCNC: 1.13 MG/DL (ref 0.57–1)
EGFRCR SERPLBLD CKD-EPI 2021: 59 ML/MIN/1.73
ERYTHROCYTE [DISTWIDTH] IN BLOOD BY AUTOMATED COUNT: 14.5 % (ref 11.7–15.4)
GLOBULIN SER CALC-MCNC: 2.9 G/DL (ref 1.5–4.5)
GLUCOSE SERPL-MCNC: 114 MG/DL (ref 70–99)
HBA1C MFR BLD: 7 % (ref 4.8–5.6)
HCT VFR BLD AUTO: 41.8 % (ref 34–46.6)
HDLC SERPL-MCNC: 43 MG/DL
HGB BLD-MCNC: 14 G/DL (ref 11.1–15.9)
LDLC SERPL CALC-MCNC: 121 MG/DL (ref 0–99)
MCH RBC QN AUTO: 27.6 PG (ref 26.6–33)
MCHC RBC AUTO-ENTMCNC: 33.5 G/DL (ref 31.5–35.7)
MCV RBC AUTO: 82 FL (ref 79–97)
PLATELET # BLD AUTO: 292 X10E3/UL (ref 150–450)
POTASSIUM SERPL-SCNC: 4.6 MMOL/L (ref 3.5–5.2)
PROT SERPL-MCNC: 6.9 G/DL (ref 6–8.5)
RBC # BLD AUTO: 5.07 X10E6/UL (ref 3.77–5.28)
SODIUM SERPL-SCNC: 138 MMOL/L (ref 134–144)
T4 FREE SERPL-MCNC: 0.92 NG/DL (ref 0.82–1.77)
TRIGL SERPL-MCNC: 120 MG/DL (ref 0–149)
TSH SERPL DL<=0.005 MIU/L-ACNC: 2.18 UIU/ML (ref 0.45–4.5)
VLDLC SERPL CALC-MCNC: 22 MG/DL (ref 5–40)
WBC # BLD AUTO: 8.3 X10E3/UL (ref 3.4–10.8)

## 2025-04-22 ENCOUNTER — RESULTS FOLLOW-UP (OUTPATIENT)
Dept: PRIMARY CARE CLINIC | Facility: CLINIC | Age: 50
End: 2025-04-22

## 2025-04-22 DIAGNOSIS — E11.9 TYPE 2 DIABETES MELLITUS WITHOUT COMPLICATION, WITHOUT LONG-TERM CURRENT USE OF INSULIN (HCC): Primary | ICD-10-CM

## 2025-04-22 RX ORDER — METFORMIN HYDROCHLORIDE 500 MG/1
500 TABLET, EXTENDED RELEASE ORAL
Qty: 90 TABLET | Refills: 1 | Status: SHIPPED | OUTPATIENT
Start: 2025-04-22

## 2025-04-22 RX ORDER — ERGOCALCIFEROL 1.25 MG/1
50000 CAPSULE, LIQUID FILLED ORAL WEEKLY
Qty: 12 CAPSULE | Refills: 0 | Status: SHIPPED | OUTPATIENT
Start: 2025-04-22 | End: 2025-07-15

## 2025-04-23 ENCOUNTER — HOSPITAL ENCOUNTER (EMERGENCY)
Facility: HOSPITAL | Age: 50
Discharge: HOME | End: 2025-04-23
Attending: STUDENT IN AN ORGANIZED HEALTH CARE EDUCATION/TRAINING PROGRAM
Payer: MEDICAID

## 2025-04-23 ENCOUNTER — TELEPHONE (OUTPATIENT)
Dept: PRIMARY CARE CLINIC | Facility: CLINIC | Age: 50
End: 2025-04-23

## 2025-04-23 VITALS
HEART RATE: 83 BPM | TEMPERATURE: 98.1 F | RESPIRATION RATE: 18 BRPM | BODY MASS INDEX: 47.09 KG/M2 | HEIGHT: 66 IN | DIASTOLIC BLOOD PRESSURE: 81 MMHG | OXYGEN SATURATION: 100 % | WEIGHT: 293 LBS | SYSTOLIC BLOOD PRESSURE: 148 MMHG

## 2025-04-23 DIAGNOSIS — L98.9 SKIN LESION: Primary | ICD-10-CM

## 2025-04-23 LAB
GLUCOSE BLD STRIP.AUTO-MCNC: 112 MG/DL (ref 65–117)
SERVICE CMNT-IMP: NORMAL

## 2025-04-23 PROCEDURE — 82962 GLUCOSE BLOOD TEST: CPT

## 2025-04-23 PROCEDURE — 99283 EMERGENCY DEPT VISIT LOW MDM: CPT

## 2025-04-23 RX ORDER — DOXYCYCLINE HYCLATE 100 MG
100 TABLET ORAL 2 TIMES DAILY
Qty: 14 TABLET | Refills: 0 | Status: SHIPPED | OUTPATIENT
Start: 2025-04-23 | End: 2025-04-30

## 2025-04-23 ASSESSMENT — LIFESTYLE VARIABLES
HOW OFTEN DO YOU HAVE A DRINK CONTAINING ALCOHOL: NEVER
HOW MANY STANDARD DRINKS CONTAINING ALCOHOL DO YOU HAVE ON A TYPICAL DAY: PATIENT DOES NOT DRINK

## 2025-04-23 ASSESSMENT — PAIN - FUNCTIONAL ASSESSMENT: PAIN_FUNCTIONAL_ASSESSMENT: NONE - DENIES PAIN

## 2025-04-23 NOTE — ED PROVIDER NOTES
[facility] EMERGENCY DEPARTMENT  EMERGENCY DEPARTMENT ENCOUNTER      Pt Name: Stefany Noble  MRN: [id number]  Birthdate 1975  Date of evaluation: 4/23/2025  Provider: Yoon Vang PA-C    CHIEF COMPLAINT       Chief Complaint   Patient presents with    Rash         HISTORY OF PRESENT ILLNESS   (Location/Symptom, Timing/Onset, Context/Setting, Quality, Duration, Modifying Factors, Severity)  Note limiting factors.   Patient is a 49-year-old female presenting for concerns of a bump on her right forearm that been present for like 2 weeks now.  She says that she had another 1 surrounding the area to but that is since subsided.  She states this mole was trying to heal and then she painted and got more inflamed.  She denies any drainage from the area and denies any recent fevers.  She states that she went to her primary care and they were unsure when it was.  She denies any difficulty with moving the arm or significant pain unless she hits the bump on the area.  She denies any history of diabetes but states that her primary care wants to start her on metformin.  Denies having these areas on any other parts of her body or anyone else having these as well.  She is unsure of what exactly happened to cause these to show up.  Has not tried anything to get rid of them yet.            Review of External Medical Records:     Nursing Notes were reviewed.    REVIEW OF SYSTEMS    (2-9 systems for level 4, 10 or more for level 5)     Review of Systems    Except as noted above the remainder of the review of systems was reviewed and negative.       PAST MEDICAL HISTORY     Past Medical History:   Diagnosis Date    Arthritis     Asthma     Autoimmune disease     FIBROMYALGIA    Back pain     DDD    Chronic obstructive pulmonary disease (HCC)     Chronic pain     Depression     Fibromyalgia     GERD (gastroesophageal reflux disease)     Headache     migraines    Hypertension     Morbid obesity     Morbid

## 2025-04-23 NOTE — TELEPHONE ENCOUNTER
Patient called stating bumps on her arms are peeling and red. I offered the patient an appt and she stated she might go to UC

## 2025-04-23 NOTE — DISCHARGE INSTRUCTIONS
You are seen today for a skin lesion on your right forearm.  It is suspected that this could develop into an abscess and it does feel warm to the touch so we are deciding to treat this with antibiotics.  Please take the antibiotic as instructed.  I have given you follow-up with the dermatology office following today's visit that you may call and try to get an appointment with for further investigation and management of this should you have more concerns or issues.  I also recommend follow-up with your primary care provider for the issues regarding the metformin that we were discussing and potentially for the skin manifestation as well.  Please take ibuprofen or Tylenol as needed for any pain that you may have.  Please take the antibiotic in its entirety.  Please come back to the emergency department with any worsening symptoms or concerns.    Thank you for allowing us to provide you with medical care today.  We realize that you have many choices for your emergency care needs.  We thank you for choosing [facility].  Please choose us in the future for any continued health care needs.     The exam and treatment you received in the Emergency Department were for an emergent problem and are not intended as complete care. It is important that you follow up with a doctor, nurse practitioner, or physician assistant for ongoing care. If your symptoms worsen or you do not improve as expected and you are unable to reach your usual health care provider, you should return to the Emergency Department. We are available 24 hours a day.     Please make an appointment with your healthcare provider(s) for follow up of your Emergency Department visit.  Take this sheet with you when you go to your follow-up visit.

## 2025-04-23 NOTE — ED TRIAGE NOTES
Patient arrives to ed via pov with c/o bumps to right anterior forearm x 2 weeks. Pt sts she went to her PCP and was told they were unsure what it was. Pt sts the bumps have gone down except one because she hit it yesterday on something. Pt denies fevers. Pt sts also when she went to her PCP her blood sugar was elevated at 114 and pt sts she does not have hx of diabetes.

## 2025-05-12 DIAGNOSIS — I10 ESSENTIAL (PRIMARY) HYPERTENSION: ICD-10-CM

## 2025-05-12 RX ORDER — FUROSEMIDE 20 MG/1
20 TABLET ORAL 2 TIMES DAILY
Qty: 180 TABLET | Refills: 1 | Status: SHIPPED | OUTPATIENT
Start: 2025-05-12

## 2025-05-19 RX ORDER — FAMOTIDINE 20 MG/1
20 TABLET, FILM COATED ORAL 2 TIMES DAILY
Qty: 180 TABLET | Refills: 0 | Status: SHIPPED | OUTPATIENT
Start: 2025-05-19

## 2025-05-22 ENCOUNTER — TELEPHONE (OUTPATIENT)
Dept: PRIMARY CARE CLINIC | Facility: CLINIC | Age: 50
End: 2025-05-22

## 2025-05-23 NOTE — TELEPHONE ENCOUNTER
Patient notified   Detail Level: Detailed Detail Level: Generalized Detail Level: Zone Detail Level: Simple

## 2025-05-23 NOTE — TELEPHONE ENCOUNTER
Xrays results were discussed with patient. Patient also wanted to know if she is able to get a blood sugar kit so she can monitor her blood sugars. Would like that sent to [facility] on [address].

## 2025-06-07 DIAGNOSIS — F51.5 NIGHTMARE DISORDER: ICD-10-CM

## 2025-06-09 RX ORDER — PRAZOSIN HYDROCHLORIDE 2 MG/1
CAPSULE ORAL
Qty: 60 CAPSULE | Refills: 2 | Status: SHIPPED | OUTPATIENT
Start: 2025-06-09

## 2025-06-10 DIAGNOSIS — F51.5 NIGHTMARE DISORDER: ICD-10-CM

## 2025-06-10 DIAGNOSIS — I10 ESSENTIAL (PRIMARY) HYPERTENSION: ICD-10-CM

## 2025-06-10 DIAGNOSIS — F32.9 MAJOR DEPRESSIVE DISORDER WITH SINGLE EPISODE, REMISSION STATUS UNSPECIFIED: ICD-10-CM

## 2025-06-10 RX ORDER — PRAZOSIN HYDROCHLORIDE 5 MG/1
5 CAPSULE ORAL NIGHTLY
Qty: 30 CAPSULE | Refills: 2 | Status: SHIPPED | OUTPATIENT
Start: 2025-06-10

## 2025-06-10 RX ORDER — FLUOXETINE HYDROCHLORIDE 40 MG/1
CAPSULE ORAL DAILY
Qty: 90 CAPSULE | Refills: 1 | Status: SHIPPED | OUTPATIENT
Start: 2025-06-10

## 2025-06-10 RX ORDER — CLONIDINE HYDROCHLORIDE 0.1 MG/1
0.1 TABLET ORAL 2 TIMES DAILY
Qty: 180 TABLET | Refills: 1 | Status: SHIPPED | OUTPATIENT
Start: 2025-06-10

## 2025-06-10 RX ORDER — FAMOTIDINE 20 MG/1
20 TABLET, FILM COATED ORAL 2 TIMES DAILY
Qty: 180 TABLET | Refills: 0 | Status: SHIPPED | OUTPATIENT
Start: 2025-06-10

## 2025-07-10 DIAGNOSIS — N93.9 ABNORMAL UTERINE BLEEDING (AUB): Primary | ICD-10-CM

## 2025-07-15 ENCOUNTER — PROCEDURE VISIT (OUTPATIENT)
Age: 50
End: 2025-07-15

## 2025-07-15 DIAGNOSIS — N93.9 ABNORMAL UTERINE BLEEDING: Primary | ICD-10-CM

## 2025-07-15 NOTE — PROGRESS NOTES
Sonohysterography procedure    Stefany Noble is a ,  50 y.o. female Black /  No LMP recorded. (Menstrual status: Irregular periods).     She presents for a sonohysterography.   The indications for this procedure were reviewed with the patient. The procedure was explained in detail and all questions were answered.   Procedure:  The patient was placed in the lithotomy position. A graves speculum was introduced into the vagina and the cervix was visualized. The cervix was prepped with iodine solution. A [employer]'s Hysterography catheter was then introduced into the uterine cavity and the speculum was removed. Sterile sonohysterography with 3D Reconstruction was performed. The endometrial cavity was distended with sterile saline.  The findings are as follows: cavity with 2 polypoid lesions.  The patient tolerated the procedure well without complication, and was discharged to home.     Has been having irregular spotting, not menstrual cycles regularly. Discussed that in light of bleeding and polyps on US, recommend hysteroscopy D&C, Discussed the procedure in detail and the r/b/a. Will schedule.    MD Nils Chawla OB/Gyn

## 2025-07-16 PROBLEM — N93.9 ABNORMAL UTERINE BLEEDING: Status: ACTIVE | Noted: 2025-07-16

## 2025-07-21 ENCOUNTER — TELEPHONE (OUTPATIENT)
Age: 50
End: 2025-07-21

## 2025-07-21 NOTE — TELEPHONE ENCOUNTER
PT name and  verified      51 yo last ov 7/15/25, surgery 25, last ae 11/15/24    PT reports she is having surgery 25, and is asking if \"instead of getting just the polyps, can  go ahead with hysterectomy, I have fibroids, can she just do everything?\"  RN relayed to the PT that  was out of the office this week and that the message/question would be sent to her, but she will not be back in the office until , and that she would not read the message nor give a response back until then.  PT verbalizes understanding.    Please review question/request    Thank you

## 2025-07-22 ENCOUNTER — HOSPITAL ENCOUNTER (OUTPATIENT)
Facility: HOSPITAL | Age: 50
Discharge: HOME | End: 2025-07-25
Payer: MEDICAID

## 2025-07-22 VITALS
HEART RATE: 87 BPM | BODY MASS INDEX: 47.09 KG/M2 | RESPIRATION RATE: 16 BRPM | WEIGHT: 293 LBS | DIASTOLIC BLOOD PRESSURE: 92 MMHG | HEIGHT: 66 IN | OXYGEN SATURATION: 95 % | SYSTOLIC BLOOD PRESSURE: 147 MMHG | TEMPERATURE: 96.8 F

## 2025-07-22 DIAGNOSIS — N93.9 ABNORMAL UTERINE BLEEDING: ICD-10-CM

## 2025-07-22 LAB
ANION GAP SERPL CALC-SCNC: 5 MMOL/L (ref 2–12)
BASOPHILS # BLD: 0 K/UL (ref 0–0.1)
BASOPHILS NFR BLD: 0 % (ref 0–1)
BUN SERPL-MCNC: 12 MG/DL (ref 6–20)
BUN/CREAT SERPL: 10 (ref 12–20)
CALCIUM SERPL-MCNC: 9.4 MG/DL (ref 8.5–10.1)
CHLORIDE SERPL-SCNC: 104 MMOL/L (ref 97–108)
CO2 SERPL-SCNC: 28 MMOL/L (ref 21–32)
CREAT SERPL-MCNC: 1.26 MG/DL (ref 0.55–1.02)
DIFFERENTIAL METHOD BLD: ABNORMAL
EOSINOPHIL # BLD: 0.08 K/UL (ref 0–0.4)
EOSINOPHIL NFR BLD: 1 % (ref 0–7)
ERYTHROCYTE [DISTWIDTH] IN BLOOD BY AUTOMATED COUNT: 13.7 % (ref 11.5–14.5)
GLUCOSE SERPL-MCNC: 139 MG/DL (ref 65–100)
HCT VFR BLD AUTO: 38.9 % (ref 35–47)
HGB BLD-MCNC: 13.8 G/DL (ref 11.5–16)
IMM GRANULOCYTES # BLD AUTO: 0 K/UL
IMM GRANULOCYTES NFR BLD AUTO: 0 %
LYMPHOCYTES # BLD: 3.24 K/UL (ref 0.8–3.5)
LYMPHOCYTES NFR BLD: 40 % (ref 12–49)
MCH RBC QN AUTO: 27.4 PG (ref 26–34)
MCHC RBC AUTO-ENTMCNC: 35.5 G/DL (ref 30–36.5)
MCV RBC AUTO: 77.3 FL (ref 80–99)
MONOCYTES # BLD: 0.41 K/UL (ref 0–1)
MONOCYTES NFR BLD: 5 % (ref 5–13)
NEUTS SEG # BLD: 4.37 K/UL (ref 1.8–8)
NEUTS SEG NFR BLD: 54 % (ref 32–75)
NRBC # BLD: 0 K/UL (ref 0–0.01)
NRBC BLD-RTO: 0 PER 100 WBC
PLATELET # BLD AUTO: 293 K/UL (ref 150–400)
PMV BLD AUTO: 10.8 FL (ref 8.9–12.9)
POTASSIUM SERPL-SCNC: 4.3 MMOL/L (ref 3.5–5.1)
RBC # BLD AUTO: 5.03 M/UL (ref 3.8–5.2)
RBC MORPH BLD: ABNORMAL
SODIUM SERPL-SCNC: 137 MMOL/L (ref 136–145)
WBC # BLD AUTO: 8.1 K/UL (ref 3.6–11)
WBC MORPH BLD: ABNORMAL

## 2025-07-22 PROCEDURE — 85025 COMPLETE CBC W/AUTO DIFF WBC: CPT

## 2025-07-22 PROCEDURE — 36415 COLL VENOUS BLD VENIPUNCTURE: CPT

## 2025-07-22 PROCEDURE — 93005 ELECTROCARDIOGRAM TRACING: CPT | Performed by: STUDENT IN AN ORGANIZED HEALTH CARE EDUCATION/TRAINING PROGRAM

## 2025-07-22 PROCEDURE — 80048 BASIC METABOLIC PNL TOTAL CA: CPT

## 2025-07-22 RX ORDER — IBUPROFEN 600 MG/1
600 TABLET, FILM COATED ORAL 3 TIMES DAILY PRN
COMMUNITY

## 2025-07-22 ASSESSMENT — PAIN DESCRIPTION - LOCATION: LOCATION: BACK

## 2025-07-22 ASSESSMENT — PAIN DESCRIPTION - PAIN TYPE: TYPE: CHRONIC PAIN

## 2025-07-22 ASSESSMENT — PAIN SCALES - GENERAL: PAINLEVEL_OUTOF10: 5

## 2025-07-22 ASSESSMENT — PAIN DESCRIPTION - FREQUENCY: FREQUENCY: INTERMITTENT

## 2025-07-22 ASSESSMENT — PAIN DESCRIPTION - DESCRIPTORS: DESCRIPTORS: ACHING

## 2025-07-22 ASSESSMENT — PAIN DESCRIPTION - ORIENTATION: ORIENTATION: LOWER

## 2025-07-22 NOTE — PERIOP NOTE
The patient has left a message for Dr. Foster inquiring if she can change her surgery from a hysteroscopy to a hysterectomy.  Dr. Foster will be out of the office until 7/28/2025.  Informed the patient to call Dr. Foster's office again on 7/29/2025, if she does not hear back from them.  The patient verbalized understanding of these instructions.  DOS: 8/4/2025

## 2025-07-22 NOTE — DISCHARGE INSTRUCTIONS
[facility]                   [address] [place] [address]   PRE-ADMISSION TESTING    [phone]     Surgery Date:  Monday, 8/4/2025         INSTRUCTIONS BEFORE YOUR SURGERY   Arrival Time [place] Pre-op staff will call you between 1:30 and 6pm the day before your surgery with your arrival time. If your surgery is on a Monday, they will call you the Friday before. If it's after 6pm the day prior to surgery and you have not received a time yet, please call [phone].   Where to Check In   Come through the Main Hospital entrance. Take the elevators on the left side of the lobby to the 2nd floor. The admitting desk will be on your right.     Please bring the following items to register:  photo ID, insurance card, co-pay if needed, medical directive, DNR, and/or POA if applicable.     Free  parking is available 7am to 5pm.   Food Drink Alcohol Marijuana    No food or drink (gum, mints, coffee, juice, etc) after midnight the night before surgery.      No alcohol (beer, wine, liquor) or marijuana 24 hours before or after surgery.           You may drink WATER ONLY up until 2 hours prior to your surgery time. Please do not      add anything to your water as this may result in your surgery being postponed.       If you are a diabetic, glucose tablets may be taken with water for low blood sugar if needed.   PRE-OP Medication Instructions      MEDICATIONS TO TAKE THE MORNING OF SURGERY:   Clonidine, Pepcid, Prozac, Gabapentin,and Trelegy  Fioricet, Flexeril, Percocet, and/or Flonase if needed  OK to use  your Duo Neb nebulizer and/or your Albuterol rescue inhaler if needed.  Bring your rescue inhaler with you to the hospital.         SPECIAL INSTRUCTIONS: Ask your prescribing physician when to hold/ resume your Dupixent  in relation to your surgery.         Medications to STOP 7 Days Before Surgery Non-Steroidal anti-inflammatory Drugs (NSAID's): for example: Ibuprofen,

## 2025-07-23 LAB
EKG ATRIAL RATE: 77 BPM
EKG DIAGNOSIS: NORMAL
EKG P AXIS: 29 DEGREES
EKG P-R INTERVAL: 164 MS
EKG Q-T INTERVAL: 352 MS
EKG QRS DURATION: 72 MS
EKG QTC CALCULATION (BAZETT): 398 MS
EKG R AXIS: 12 DEGREES
EKG T AXIS: -14 DEGREES
EKG VENTRICULAR RATE: 77 BPM

## 2025-07-23 PROCEDURE — 93010 ELECTROCARDIOGRAM REPORT: CPT | Performed by: INTERNAL MEDICINE

## 2025-07-28 ENCOUNTER — TELEPHONE (OUTPATIENT)
Age: 50
End: 2025-07-28

## 2025-07-28 NOTE — TELEPHONE ENCOUNTER
Emma Foster MD to Me      7/28/25  1:00 PM  She could consider a hysterectomy, but that would be a different discussion and she'd need to come in      Patient was informed of need for ov to discuss hysterectomy.    Patient was placed on the schedule to be seen in the office on 8/26/2025 .    Patient informed per surgery scheduler that next available for this type of surgery is in January 2025      Patient is wanting to cancel surgery on 8/4/2025.    ESTEBAN

## 2025-07-28 NOTE — TELEPHONE ENCOUNTER
Two patient identifiers used.      50 year old patient last seen in the office on 1/30/2025 for problem visit.    Patient is currently schedule for surgery on 8/4/2025Panel 1    Surgeon Role   Emma Foster MD Primary    Procedure Laterality Anesthesia   HYSTEROSCOPY DILATATION AND CURETTAGE POLYPECTOMY              Patient is calling to say that since she has fibroids too , she would like to have a hysterectomy, since she is being put under..      Please advise    Thank you    ? Ov to discuss , if so when

## 2025-07-28 NOTE — TELEPHONE ENCOUNTER
PT called back today 7/28 and is wanting to cancel her surgery for 8/4/25, see telephone encounter for today.

## 2025-08-05 ENCOUNTER — TELEPHONE (OUTPATIENT)
Dept: PRIMARY CARE CLINIC | Facility: CLINIC | Age: 50
End: 2025-08-05

## 2025-08-05 ENCOUNTER — OFFICE VISIT (OUTPATIENT)
Dept: PRIMARY CARE CLINIC | Facility: CLINIC | Age: 50
End: 2025-08-05
Payer: MEDICAID

## 2025-08-05 VITALS
OXYGEN SATURATION: 98 % | DIASTOLIC BLOOD PRESSURE: 80 MMHG | BODY MASS INDEX: 47.09 KG/M2 | HEIGHT: 66 IN | RESPIRATION RATE: 16 BRPM | SYSTOLIC BLOOD PRESSURE: 124 MMHG | HEART RATE: 79 BPM | WEIGHT: 293 LBS

## 2025-08-05 DIAGNOSIS — N63.20 MASS OF LEFT BREAST, UNSPECIFIED QUADRANT: Primary | ICD-10-CM

## 2025-08-05 DIAGNOSIS — Z12.31 ENCOUNTER FOR SCREENING MAMMOGRAM FOR MALIGNANT NEOPLASM OF BREAST: ICD-10-CM

## 2025-08-05 DIAGNOSIS — I10 ESSENTIAL (PRIMARY) HYPERTENSION: ICD-10-CM

## 2025-08-05 PROCEDURE — 99214 OFFICE O/P EST MOD 30 MIN: CPT | Performed by: NURSE PRACTITIONER

## 2025-08-05 PROCEDURE — 3074F SYST BP LT 130 MM HG: CPT | Performed by: NURSE PRACTITIONER

## 2025-08-05 PROCEDURE — 3079F DIAST BP 80-89 MM HG: CPT | Performed by: NURSE PRACTITIONER

## 2025-08-13 DIAGNOSIS — F51.01 PRIMARY INSOMNIA: ICD-10-CM

## 2025-08-22 ENCOUNTER — COMMUNITY OUTREACH (OUTPATIENT)
Dept: PRIMARY CARE CLINIC | Facility: CLINIC | Age: 50
End: 2025-08-22

## 2025-08-26 ENCOUNTER — OFFICE VISIT (OUTPATIENT)
Age: 50
End: 2025-08-26
Payer: MEDICAID

## 2025-08-26 VITALS
BODY MASS INDEX: 59.46 KG/M2 | DIASTOLIC BLOOD PRESSURE: 90 MMHG | HEART RATE: 93 BPM | OXYGEN SATURATION: 96 % | WEIGHT: 293 LBS | SYSTOLIC BLOOD PRESSURE: 158 MMHG

## 2025-08-26 DIAGNOSIS — N93.9 ABNORMAL UTERINE BLEEDING: Primary | ICD-10-CM

## 2025-08-26 PROCEDURE — 3080F DIAST BP >= 90 MM HG: CPT | Performed by: STUDENT IN AN ORGANIZED HEALTH CARE EDUCATION/TRAINING PROGRAM

## 2025-08-26 PROCEDURE — 99213 OFFICE O/P EST LOW 20 MIN: CPT | Performed by: STUDENT IN AN ORGANIZED HEALTH CARE EDUCATION/TRAINING PROGRAM

## 2025-08-26 PROCEDURE — 3077F SYST BP >= 140 MM HG: CPT | Performed by: STUDENT IN AN ORGANIZED HEALTH CARE EDUCATION/TRAINING PROGRAM

## (undated) DEVICE — DRAPE,REIN 53X77,STERILE: Brand: MEDLINE

## (undated) DEVICE — ARTHROSCOPY RICHMOND-LF: Brand: MEDLINE INDUSTRIES, INC.

## (undated) DEVICE — SLING ORTHOPEDIC PCH UNIV 19.5X9 IN 2-39 IN ARM W/ FOAM STRP

## (undated) DEVICE — INFECTION CONTROL KIT SYS

## (undated) DEVICE — 4-PORT MANIFOLD: Brand: NEPTUNE 2

## (undated) DEVICE — 5.0 MM I.D. UNIVERSAL CANNULA, 76                                    MM LONG, BLUE, LATEX SEAL,                                    SINGLE-USE STERILE PACKS, BOX OF 10.                                    INCLUDES OBTURATOR AND TROCAR

## (undated) DEVICE — PREP SKN CHLRAPRP 26ML TNT -- CONVERT TO ITEM 373320

## (undated) DEVICE — Z DISCONTINUED GLOVE SURG SZ 7.5 L12IN FNGR THK13MIL WHT ISOLEX

## (undated) DEVICE — INTENDED FOR TISSUE SEPARATION, AND OTHER PROCEDURES THAT REQUIRE A SHARP SURGICAL BLADE TO PUNCTURE OR CUT.: Brand: BARD-PARKER ® CARBON RIB-BACK BLADES

## (undated) DEVICE — SOLUTION IRRIG 3000ML LAC R FLX CONT

## (undated) DEVICE — Z INACTIVE PER BARD SET TBNG L8FT IN FLO GRAV W/ TWO SPIK

## (undated) DEVICE — SHOULDER SUSPENSION KIT 6 PER BOX

## (undated) DEVICE — DRAPE,EXTREMITY,89X128,STERILE: Brand: MEDLINE

## (undated) DEVICE — STERILE POLYISOPRENE POWDER-FREE SURGICAL GLOVES WITH EMOLLIENT COATING: Brand: PROTEXIS

## (undated) DEVICE — SUT ETHLN 3-0 18IN PS1 BLK --

## (undated) DEVICE — CURITY NON-ADHERENT STRIPS: Brand: CURITY